# Patient Record
Sex: MALE | Race: WHITE | Employment: OTHER | URBAN - METROPOLITAN AREA
[De-identification: names, ages, dates, MRNs, and addresses within clinical notes are randomized per-mention and may not be internally consistent; named-entity substitution may affect disease eponyms.]

---

## 2017-04-07 ENCOUNTER — OFFICE VISIT (OUTPATIENT)
Dept: FAMILY MEDICINE CLINIC | Age: 67
End: 2017-04-07

## 2017-04-07 VITALS
SYSTOLIC BLOOD PRESSURE: 100 MMHG | WEIGHT: 172 LBS | BODY MASS INDEX: 24.68 KG/M2 | HEART RATE: 116 BPM | DIASTOLIC BLOOD PRESSURE: 68 MMHG

## 2017-04-07 DIAGNOSIS — I10 ESSENTIAL HYPERTENSION: Primary | ICD-10-CM

## 2017-04-07 PROCEDURE — G8420 CALC BMI NORM PARAMETERS: HCPCS | Performed by: FAMILY MEDICINE

## 2017-04-07 PROCEDURE — 1123F ACP DISCUSS/DSCN MKR DOCD: CPT | Performed by: FAMILY MEDICINE

## 2017-04-07 PROCEDURE — 3017F COLORECTAL CA SCREEN DOC REV: CPT | Performed by: FAMILY MEDICINE

## 2017-04-07 PROCEDURE — 4040F PNEUMOC VAC/ADMIN/RCVD: CPT | Performed by: FAMILY MEDICINE

## 2017-04-07 PROCEDURE — 1036F TOBACCO NON-USER: CPT | Performed by: FAMILY MEDICINE

## 2017-04-07 PROCEDURE — G8427 DOCREV CUR MEDS BY ELIG CLIN: HCPCS | Performed by: FAMILY MEDICINE

## 2017-04-07 PROCEDURE — 99213 OFFICE O/P EST LOW 20 MIN: CPT | Performed by: FAMILY MEDICINE

## 2017-04-07 RX ORDER — METOPROLOL SUCCINATE 50 MG/1
50 TABLET, EXTENDED RELEASE ORAL DAILY
Qty: 90 TABLET | Refills: 1 | Status: SHIPPED | OUTPATIENT
Start: 2017-04-07 | End: 2017-12-11 | Stop reason: SDUPTHER

## 2017-04-07 RX ORDER — ATORVASTATIN CALCIUM 10 MG/1
10 TABLET, FILM COATED ORAL DAILY
Qty: 90 TABLET | Refills: 1 | Status: SHIPPED | OUTPATIENT
Start: 2017-04-07 | End: 2017-12-26 | Stop reason: SDUPTHER

## 2017-04-07 ASSESSMENT — ENCOUNTER SYMPTOMS: RESPIRATORY NEGATIVE: 1

## 2017-08-21 RX ORDER — LOSARTAN POTASSIUM 100 MG/1
TABLET ORAL
Qty: 90 TABLET | Refills: 0 | Status: SHIPPED | OUTPATIENT
Start: 2017-08-21 | End: 2017-12-26 | Stop reason: SDUPTHER

## 2017-12-11 RX ORDER — METOPROLOL SUCCINATE 50 MG/1
50 TABLET, EXTENDED RELEASE ORAL DAILY
Qty: 90 TABLET | Refills: 0 | Status: SHIPPED | OUTPATIENT
Start: 2017-12-11 | End: 2017-12-26 | Stop reason: SDUPTHER

## 2017-12-26 ENCOUNTER — OFFICE VISIT (OUTPATIENT)
Dept: FAMILY MEDICINE CLINIC | Age: 67
End: 2017-12-26

## 2017-12-26 VITALS
WEIGHT: 168 LBS | HEART RATE: 68 BPM | BODY MASS INDEX: 24.11 KG/M2 | DIASTOLIC BLOOD PRESSURE: 76 MMHG | SYSTOLIC BLOOD PRESSURE: 106 MMHG

## 2017-12-26 DIAGNOSIS — I10 ESSENTIAL HYPERTENSION: Primary | ICD-10-CM

## 2017-12-26 PROCEDURE — G8484 FLU IMMUNIZE NO ADMIN: HCPCS | Performed by: FAMILY MEDICINE

## 2017-12-26 PROCEDURE — 4040F PNEUMOC VAC/ADMIN/RCVD: CPT | Performed by: FAMILY MEDICINE

## 2017-12-26 PROCEDURE — 3017F COLORECTAL CA SCREEN DOC REV: CPT | Performed by: FAMILY MEDICINE

## 2017-12-26 PROCEDURE — 1123F ACP DISCUSS/DSCN MKR DOCD: CPT | Performed by: FAMILY MEDICINE

## 2017-12-26 PROCEDURE — G8427 DOCREV CUR MEDS BY ELIG CLIN: HCPCS | Performed by: FAMILY MEDICINE

## 2017-12-26 PROCEDURE — 99214 OFFICE O/P EST MOD 30 MIN: CPT | Performed by: FAMILY MEDICINE

## 2017-12-26 PROCEDURE — G8420 CALC BMI NORM PARAMETERS: HCPCS | Performed by: FAMILY MEDICINE

## 2017-12-26 PROCEDURE — 1036F TOBACCO NON-USER: CPT | Performed by: FAMILY MEDICINE

## 2017-12-26 RX ORDER — METOPROLOL SUCCINATE 25 MG/1
25 TABLET, EXTENDED RELEASE ORAL DAILY
Qty: 90 TABLET | Refills: 1 | Status: SHIPPED | OUTPATIENT
Start: 2017-12-26 | End: 2019-01-14 | Stop reason: SDUPTHER

## 2017-12-26 RX ORDER — TADALAFIL 20 MG/1
20 TABLET ORAL PRN
Qty: 18 TABLET | Refills: 3 | Status: SHIPPED | OUTPATIENT
Start: 2017-12-26 | End: 2019-01-14

## 2017-12-26 RX ORDER — ATORVASTATIN CALCIUM 10 MG/1
10 TABLET, FILM COATED ORAL DAILY
Qty: 90 TABLET | Refills: 1 | Status: SHIPPED | OUTPATIENT
Start: 2017-12-26 | End: 2018-11-03 | Stop reason: SDUPTHER

## 2017-12-26 RX ORDER — LOSARTAN POTASSIUM 50 MG/1
TABLET ORAL
Qty: 90 TABLET | Refills: 1 | Status: SHIPPED | OUTPATIENT
Start: 2017-12-26 | End: 2018-11-03 | Stop reason: SDUPTHER

## 2017-12-26 ASSESSMENT — PATIENT HEALTH QUESTIONNAIRE - PHQ9
SUM OF ALL RESPONSES TO PHQ QUESTIONS 1-9: 0
1. LITTLE INTEREST OR PLEASURE IN DOING THINGS: 0
SUM OF ALL RESPONSES TO PHQ9 QUESTIONS 1 & 2: 0
2. FEELING DOWN, DEPRESSED OR HOPELESS: 0

## 2017-12-26 ASSESSMENT — ENCOUNTER SYMPTOMS
SHORTNESS OF BREATH: 0
WHEEZING: 0

## 2017-12-26 NOTE — PROGRESS NOTES
Subjective:      Patient ID: Nayely San is a 75 y.o. male. HPI  No SE from his meds, and feels fine. No LHed ness despite the low BP  Review of Systems   Respiratory: Negative for shortness of breath and wheezing. Allergic/Immunologic: Negative for environmental allergies. Objective:   Physical Exam   Constitutional: He appears well-developed and well-nourished. Neck: Normal range of motion. Neck supple. Carotid bruit is not present. No thyromegaly present. Cardiovascular: Normal rate, regular rhythm and normal heart sounds. No murmur heard. Pulmonary/Chest: Effort normal and breath sounds normal.   Abdominal: Soft. Bowel sounds are normal. He exhibits no abdominal bruit. There is no hepatosplenomegaly. There is no tenderness. Genitourinary: Rectum normal and prostate normal.   Genitourinary Comments: Prostate flat   Musculoskeletal: He exhibits no edema. Lymphadenopathy:     He has no cervical adenopathy. Assessment:    BP lower than necessary        Plan:      Decrease the metoprolol to 25 mg daily  Check BP in a week.   6 mos

## 2018-07-13 ENCOUNTER — OFFICE VISIT (OUTPATIENT)
Dept: FAMILY MEDICINE CLINIC | Age: 68
End: 2018-07-13

## 2018-07-13 VITALS
SYSTOLIC BLOOD PRESSURE: 130 MMHG | WEIGHT: 164 LBS | HEART RATE: 112 BPM | BODY MASS INDEX: 23.48 KG/M2 | HEIGHT: 70 IN | DIASTOLIC BLOOD PRESSURE: 84 MMHG

## 2018-07-13 DIAGNOSIS — Z79.899 ON STATIN THERAPY: ICD-10-CM

## 2018-07-13 DIAGNOSIS — Z00.00 MEDICARE ANNUAL WELLNESS VISIT, SUBSEQUENT: Primary | ICD-10-CM

## 2018-07-13 DIAGNOSIS — Z00.00 ROUTINE GENERAL MEDICAL EXAMINATION AT A HEALTH CARE FACILITY: ICD-10-CM

## 2018-07-13 DIAGNOSIS — I10 ESSENTIAL HYPERTENSION: ICD-10-CM

## 2018-07-13 DIAGNOSIS — E78.00 PURE HYPERCHOLESTEROLEMIA: ICD-10-CM

## 2018-07-13 DIAGNOSIS — R06.09 DOE (DYSPNEA ON EXERTION): ICD-10-CM

## 2018-07-13 LAB
A/G RATIO: 1.2 (ref 1.1–2.2)
ALBUMIN SERPL-MCNC: 4.7 G/DL (ref 3.4–5)
ALP BLD-CCNC: 60 U/L (ref 40–129)
ALT SERPL-CCNC: 38 U/L (ref 10–40)
ANION GAP SERPL CALCULATED.3IONS-SCNC: 23 MMOL/L (ref 3–16)
AST SERPL-CCNC: 73 U/L (ref 15–37)
BILIRUB SERPL-MCNC: 0.5 MG/DL (ref 0–1)
BUN BLDV-MCNC: 23 MG/DL (ref 7–20)
CALCIUM SERPL-MCNC: 9.7 MG/DL (ref 8.3–10.6)
CHLORIDE BLD-SCNC: 99 MMOL/L (ref 99–110)
CHOLESTEROL, TOTAL: 169 MG/DL (ref 0–199)
CO2: 18 MMOL/L (ref 21–32)
CREAT SERPL-MCNC: 2 MG/DL (ref 0.8–1.3)
GFR AFRICAN AMERICAN: 40
GFR NON-AFRICAN AMERICAN: 33
GLOBULIN: 3.8 G/DL
GLUCOSE BLD-MCNC: 79 MG/DL (ref 70–99)
HDLC SERPL-MCNC: 67 MG/DL (ref 40–60)
LDL CHOLESTEROL CALCULATED: 58 MG/DL
POTASSIUM SERPL-SCNC: 4.2 MMOL/L (ref 3.5–5.1)
SODIUM BLD-SCNC: 140 MMOL/L (ref 136–145)
TOTAL CK: 111 U/L (ref 39–308)
TOTAL PROTEIN: 8.5 G/DL (ref 6.4–8.2)
TRIGL SERPL-MCNC: 220 MG/DL (ref 0–150)
VLDLC SERPL CALC-MCNC: 44 MG/DL

## 2018-07-13 PROCEDURE — 36415 COLL VENOUS BLD VENIPUNCTURE: CPT | Performed by: FAMILY MEDICINE

## 2018-07-13 PROCEDURE — G0439 PPPS, SUBSEQ VISIT: HCPCS | Performed by: FAMILY MEDICINE

## 2018-07-13 PROCEDURE — 4040F PNEUMOC VAC/ADMIN/RCVD: CPT | Performed by: FAMILY MEDICINE

## 2018-07-13 RX ORDER — TADALAFIL 20 MG/1
20 TABLET ORAL PRN
Qty: 20 TABLET | Refills: 3 | Status: SHIPPED | OUTPATIENT
Start: 2018-07-13 | End: 2019-01-14 | Stop reason: SDUPTHER

## 2018-07-13 ASSESSMENT — LIFESTYLE VARIABLES
HOW MANY STANDARD DRINKS CONTAINING ALCOHOL DO YOU HAVE ON A TYPICAL DAY: 0
HOW OFTEN DO YOU HAVE SIX OR MORE DRINKS ON ONE OCCASION: 1
HOW OFTEN DURING THE LAST YEAR HAVE YOU HAD A FEELING OF GUILT OR REMORSE AFTER DRINKING: 0
HOW OFTEN DURING THE LAST YEAR HAVE YOU FAILED TO DO WHAT WAS NORMALLY EXPECTED FROM YOU BECAUSE OF DRINKING: 0
HOW OFTEN DURING THE LAST YEAR HAVE YOU FOUND THAT YOU WERE NOT ABLE TO STOP DRINKING ONCE YOU HAD STARTED: 0
HOW OFTEN DURING THE LAST YEAR HAVE YOU NEEDED AN ALCOHOLIC DRINK FIRST THING IN THE MORNING TO GET YOURSELF GOING AFTER A NIGHT OF HEAVY DRINKING: 0
AUDIT-C TOTAL SCORE: 3
HOW OFTEN DO YOU HAVE A DRINK CONTAINING ALCOHOL: 2
HOW OFTEN DURING THE LAST YEAR HAVE YOU BEEN UNABLE TO REMEMBER WHAT HAPPENED THE NIGHT BEFORE BECAUSE YOU HAD BEEN DRINKING: 0

## 2018-07-13 ASSESSMENT — PATIENT HEALTH QUESTIONNAIRE - PHQ9: SUM OF ALL RESPONSES TO PHQ QUESTIONS 1-9: 0

## 2018-07-13 ASSESSMENT — ANXIETY QUESTIONNAIRES: GAD7 TOTAL SCORE: 0

## 2018-07-13 NOTE — PROGRESS NOTES
Relation Age of Onset    Cancer Brother        CareTeam (Including outside providers/suppliers regularly involved in providing care):   Patient Care Team:  Teri Hoffman MD as PCP - General  Teri Hoffman MD as PCP - S Attributed Provider    Wt Readings from Last 3 Encounters:   07/13/18 164 lb (74.4 kg)   12/26/17 168 lb (76.2 kg)   04/07/17 172 lb (78 kg)     Vitals:    07/13/18 0830   BP: 130/84   Pulse: 112   Weight: 164 lb (74.4 kg)   Height: 5' 10\" (1.778 m)       Patient's complete Health Risk Assessment and screening values have been reviewed and are found in Flowsheets. The following problems were reviewed today and where indicated follow up appointments were made and/or referrals ordered. Positive Risk Factor Screenings with Interventions:     Health Habits/Nutrition:  Health Habits/Nutrition  Do you exercise for at least 20 minutes 2-3 times per week?: (!) No  Have you lost any weight without trying in the past 3 months?: No  Do you eat fewer than 2 meals per day?: No  Have you seen a dentist within the past year?: Yes  Body mass index is 23.53 kg/m².   Health Habits/Nutrition Interventions:  · Little interest in exercise    Safety:  Safety  Do you have working smoke detectors?: Yes  Have all throw rugs been removed or fastened?: Yes  Do you have non-slip mats in all bathtubs?: (!) No  Do all of your stairways have a railing or banister?: Yes  Are your doorways, halls and stairs free of clutter?: Yes  Do you always fasten your seatbelt when you are in a car?: Yes  Safety Interventions:  · Textured floor    Personalized Preventive Plan   Current Health Maintenance Status  Immunization History   Administered Date(s) Administered    Influenza, High Dose 10/07/2016    Pneumococcal 13-valent Conjugate ([id number]) 09/01/2015    Pneumococcal Polysaccharide ([id number]) 02/10/2014    Td 05/28/2013        Health Maintenance   Topic Date Due    Potassium monitoring  1950    Creatinine

## 2018-07-17 ENCOUNTER — TELEPHONE (OUTPATIENT)
Dept: FAMILY MEDICINE CLINIC | Age: 68
End: 2018-07-17

## 2018-07-17 DIAGNOSIS — N18.30 CHRONIC KIDNEY DISEASE, STAGE III (MODERATE) (HCC): Primary | ICD-10-CM

## 2018-07-23 RX ORDER — METOPROLOL SUCCINATE 50 MG/1
50 TABLET, EXTENDED RELEASE ORAL DAILY
Qty: 90 TABLET | Refills: 0 | Status: SHIPPED | OUTPATIENT
Start: 2018-07-23 | End: 2018-11-03 | Stop reason: SDUPTHER

## 2018-07-23 NOTE — TELEPHONE ENCOUNTER
Medication:   Requested Prescriptions     Pending Prescriptions Disp Refills    metoprolol succinate (TOPROL XL) 50 MG extended release tablet [Pharmacy Med Name: METOPROLOL ER SUCCINATE 50MG TABS] 90 tablet 0     Sig: TAKE 1 TABLET BY MOUTH DAILY         Last appt: 7/13/2018   Next appt: 1/14/2019

## 2018-07-31 RX ORDER — FLUTICASONE FUROATE AND VILANTEROL 100; 25 UG/1; UG/1
1 POWDER RESPIRATORY (INHALATION) DAILY
Qty: 3 EACH | Refills: 3 | Status: SHIPPED | OUTPATIENT
Start: 2018-07-31 | End: 2019-01-14

## 2018-07-31 NOTE — TELEPHONE ENCOUNTER
Patient requesting a medication refill. Medication Breo  Dosage 100-25 mcg  Frequencyone puff daily  Last filled on Sample given on 7/13/18  [facility]  Next office visit1/14/19     This will take 10 days to process. He is asking for another sample if possible.     LOV 7/13/18

## 2018-08-09 ENCOUNTER — TELEPHONE (OUTPATIENT)
Dept: FAMILY MEDICINE CLINIC | Age: 68
End: 2018-08-09

## 2018-11-05 RX ORDER — ATORVASTATIN CALCIUM 10 MG/1
10 TABLET, FILM COATED ORAL DAILY
Qty: 90 TABLET | Refills: 0 | Status: SHIPPED | OUTPATIENT
Start: 2018-11-05 | End: 2019-01-14 | Stop reason: SDUPTHER

## 2018-11-05 RX ORDER — LOSARTAN POTASSIUM 50 MG/1
TABLET ORAL
Qty: 90 TABLET | Refills: 0 | Status: SHIPPED | OUTPATIENT
Start: 2018-11-05 | End: 2019-01-14 | Stop reason: SDUPTHER

## 2018-11-05 RX ORDER — METOPROLOL SUCCINATE 50 MG/1
50 TABLET, EXTENDED RELEASE ORAL DAILY
Qty: 90 TABLET | Refills: 0 | Status: SHIPPED | OUTPATIENT
Start: 2018-11-05 | End: 2019-01-14 | Stop reason: DRUGHIGH

## 2019-01-14 ENCOUNTER — OFFICE VISIT (OUTPATIENT)
Dept: FAMILY MEDICINE CLINIC | Age: 69
End: 2019-01-14
Payer: MEDICARE

## 2019-01-14 VITALS
WEIGHT: 167 LBS | BODY MASS INDEX: 23.96 KG/M2 | HEART RATE: 89 BPM | SYSTOLIC BLOOD PRESSURE: 110 MMHG | DIASTOLIC BLOOD PRESSURE: 76 MMHG

## 2019-01-14 DIAGNOSIS — I10 ESSENTIAL HYPERTENSION: Primary | ICD-10-CM

## 2019-01-14 DIAGNOSIS — N52.9 VASCULOGENIC ERECTILE DYSFUNCTION, UNSPECIFIED VASCULOGENIC ERECTILE DYSFUNCTION TYPE: ICD-10-CM

## 2019-01-14 DIAGNOSIS — Z23 NEEDS FLU SHOT: ICD-10-CM

## 2019-01-14 DIAGNOSIS — E78.00 HYPERCHOLESTEREMIA: ICD-10-CM

## 2019-01-14 PROCEDURE — 1101F PT FALLS ASSESS-DOCD LE1/YR: CPT | Performed by: FAMILY MEDICINE

## 2019-01-14 PROCEDURE — G0008 ADMIN INFLUENZA VIRUS VAC: HCPCS | Performed by: FAMILY MEDICINE

## 2019-01-14 PROCEDURE — 1036F TOBACCO NON-USER: CPT | Performed by: FAMILY MEDICINE

## 2019-01-14 PROCEDURE — G8482 FLU IMMUNIZE ORDER/ADMIN: HCPCS | Performed by: FAMILY MEDICINE

## 2019-01-14 PROCEDURE — G8427 DOCREV CUR MEDS BY ELIG CLIN: HCPCS | Performed by: FAMILY MEDICINE

## 2019-01-14 PROCEDURE — 3017F COLORECTAL CA SCREEN DOC REV: CPT | Performed by: FAMILY MEDICINE

## 2019-01-14 PROCEDURE — 4040F PNEUMOC VAC/ADMIN/RCVD: CPT | Performed by: FAMILY MEDICINE

## 2019-01-14 PROCEDURE — 90662 IIV NO PRSV INCREASED AG IM: CPT | Performed by: FAMILY MEDICINE

## 2019-01-14 PROCEDURE — 99214 OFFICE O/P EST MOD 30 MIN: CPT | Performed by: FAMILY MEDICINE

## 2019-01-14 PROCEDURE — 1123F ACP DISCUSS/DSCN MKR DOCD: CPT | Performed by: FAMILY MEDICINE

## 2019-01-14 PROCEDURE — G8420 CALC BMI NORM PARAMETERS: HCPCS | Performed by: FAMILY MEDICINE

## 2019-01-14 RX ORDER — LOSARTAN POTASSIUM 50 MG/1
50 TABLET ORAL DAILY
Qty: 90 TABLET | Refills: 1 | Status: SHIPPED | OUTPATIENT
Start: 2019-01-14 | End: 2019-05-31

## 2019-01-14 RX ORDER — LOSARTAN POTASSIUM 50 MG/1
50 TABLET ORAL DAILY
Qty: 90 TABLET | Refills: 1 | Status: SHIPPED | OUTPATIENT
Start: 2019-01-14 | End: 2019-01-14 | Stop reason: SDUPTHER

## 2019-01-14 RX ORDER — SILDENAFIL 100 MG/1
100 TABLET, FILM COATED ORAL DAILY PRN
Qty: 20 TABLET | Refills: 3 | Status: SHIPPED | OUTPATIENT
Start: 2019-01-14 | End: 2019-01-14 | Stop reason: SDUPTHER

## 2019-01-14 RX ORDER — ATORVASTATIN CALCIUM 10 MG/1
10 TABLET, FILM COATED ORAL DAILY
Qty: 90 TABLET | Refills: 3 | Status: SHIPPED | OUTPATIENT
Start: 2019-01-14 | End: 2019-01-14 | Stop reason: SDUPTHER

## 2019-01-14 RX ORDER — ATORVASTATIN CALCIUM 10 MG/1
10 TABLET, FILM COATED ORAL DAILY
Qty: 90 TABLET | Refills: 3 | Status: SHIPPED | OUTPATIENT
Start: 2019-01-14 | End: 2020-01-15 | Stop reason: SDUPTHER

## 2019-01-14 RX ORDER — METOPROLOL SUCCINATE 25 MG/1
25 TABLET, EXTENDED RELEASE ORAL DAILY
Qty: 90 TABLET | Refills: 1 | Status: SHIPPED | OUTPATIENT
Start: 2019-01-14 | End: 2020-01-15 | Stop reason: SDUPTHER

## 2019-01-14 RX ORDER — METOPROLOL SUCCINATE 25 MG/1
25 TABLET, EXTENDED RELEASE ORAL DAILY
Qty: 90 TABLET | Refills: 1 | Status: SHIPPED | OUTPATIENT
Start: 2019-01-14 | End: 2019-01-14 | Stop reason: SDUPTHER

## 2019-01-14 RX ORDER — SILDENAFIL 100 MG/1
100 TABLET, FILM COATED ORAL DAILY PRN
Qty: 20 TABLET | Refills: 3 | Status: SHIPPED | OUTPATIENT
Start: 2019-01-14 | End: 2020-01-15 | Stop reason: SDUPTHER

## 2019-01-14 ASSESSMENT — ENCOUNTER SYMPTOMS
SHORTNESS OF BREATH: 1
GASTROINTESTINAL NEGATIVE: 1

## 2019-03-04 ENCOUNTER — TELEPHONE (OUTPATIENT)
Dept: FAMILY MEDICINE CLINIC | Age: 69
End: 2019-03-04

## 2019-05-31 ENCOUNTER — TELEPHONE (OUTPATIENT)
Dept: FAMILY MEDICINE CLINIC | Age: 69
End: 2019-05-31

## 2019-05-31 RX ORDER — CANDESARTAN 8 MG/1
8 TABLET ORAL DAILY
Qty: 30 TABLET | Refills: 5 | Status: CANCELLED | OUTPATIENT
Start: 2019-05-31

## 2019-05-31 NOTE — TELEPHONE ENCOUNTER
Pt got a recall notice from [facility] for Losartan. Please call in a different medication.   Please send it to   HEART [facility] [address], [address]    LOV 1/14/19  FUTURE VISIT 7/15/19

## 2019-07-15 ENCOUNTER — OFFICE VISIT (OUTPATIENT)
Dept: FAMILY MEDICINE CLINIC | Age: 69
End: 2019-07-15
Payer: MEDICARE

## 2019-07-15 VITALS
BODY MASS INDEX: 23.77 KG/M2 | HEIGHT: 70 IN | SYSTOLIC BLOOD PRESSURE: 134 MMHG | DIASTOLIC BLOOD PRESSURE: 88 MMHG | WEIGHT: 166 LBS | HEART RATE: 80 BPM

## 2019-07-15 DIAGNOSIS — Z12.2 ENCOUNTER FOR SCREENING FOR CANCER OF RESPIRATORY ORGANS: ICD-10-CM

## 2019-07-15 DIAGNOSIS — E78.00 HYPERCHOLESTEREMIA: ICD-10-CM

## 2019-07-15 DIAGNOSIS — Z00.00 ROUTINE GENERAL MEDICAL EXAMINATION AT A HEALTH CARE FACILITY: ICD-10-CM

## 2019-07-15 DIAGNOSIS — Z12.5 SCREENING FOR PROSTATE CANCER: Primary | ICD-10-CM

## 2019-07-15 DIAGNOSIS — Z87.891 PERSONAL HISTORY OF TOBACCO USE: ICD-10-CM

## 2019-07-15 LAB
CHOLESTEROL, FASTING: 149 MG/DL (ref 0–199)
HDLC SERPL-MCNC: 60 MG/DL (ref 40–60)
LDL CHOLESTEROL CALCULATED: 52 MG/DL
PROSTATE SPECIFIC ANTIGEN: 0.7 NG/ML (ref 0–4)
TRIGLYCERIDE, FASTING: 186 MG/DL (ref 0–150)
VLDLC SERPL CALC-MCNC: 37 MG/DL

## 2019-07-15 PROCEDURE — 4040F PNEUMOC VAC/ADMIN/RCVD: CPT | Performed by: FAMILY MEDICINE

## 2019-07-15 PROCEDURE — 36415 COLL VENOUS BLD VENIPUNCTURE: CPT | Performed by: FAMILY MEDICINE

## 2019-07-15 PROCEDURE — 3017F COLORECTAL CA SCREEN DOC REV: CPT | Performed by: FAMILY MEDICINE

## 2019-07-15 PROCEDURE — G0296 VISIT TO DETERM LDCT ELIG: HCPCS | Performed by: FAMILY MEDICINE

## 2019-07-15 PROCEDURE — G0439 PPPS, SUBSEQ VISIT: HCPCS | Performed by: FAMILY MEDICINE

## 2019-07-15 PROCEDURE — 1123F ACP DISCUSS/DSCN MKR DOCD: CPT | Performed by: FAMILY MEDICINE

## 2019-07-15 RX ORDER — OLMESARTAN MEDOXOMIL 20 MG/1
20 TABLET ORAL DAILY
Qty: 90 TABLET | Refills: 1 | Status: SHIPPED | OUTPATIENT
Start: 2019-07-15 | End: 2020-01-15 | Stop reason: SDUPTHER

## 2019-07-15 ASSESSMENT — LIFESTYLE VARIABLES
HOW OFTEN DURING THE LAST YEAR HAVE YOU HAD A FEELING OF GUILT OR REMORSE AFTER DRINKING: 0
HAS A RELATIVE, FRIEND, DOCTOR, OR ANOTHER HEALTH PROFESSIONAL EXPRESSED CONCERN ABOUT YOUR DRINKING OR SUGGESTED YOU CUT DOWN: 0
AUDIT TOTAL SCORE: 2
HOW OFTEN DO YOU HAVE A DRINK CONTAINING ALCOHOL: 1
HOW OFTEN DURING THE LAST YEAR HAVE YOU FAILED TO DO WHAT WAS NORMALLY EXPECTED FROM YOU BECAUSE OF DRINKING: 0
HOW MANY STANDARD DRINKS CONTAINING ALCOHOL DO YOU HAVE ON A TYPICAL DAY: 0
HOW OFTEN DURING THE LAST YEAR HAVE YOU BEEN UNABLE TO REMEMBER WHAT HAPPENED THE NIGHT BEFORE BECAUSE YOU HAD BEEN DRINKING: 0
HOW OFTEN DURING THE LAST YEAR HAVE YOU FOUND THAT YOU WERE NOT ABLE TO STOP DRINKING ONCE YOU HAD STARTED: 0
HOW OFTEN DURING THE LAST YEAR HAVE YOU NEEDED AN ALCOHOLIC DRINK FIRST THING IN THE MORNING TO GET YOURSELF GOING AFTER A NIGHT OF HEAVY DRINKING: 0
AUDIT-C TOTAL SCORE: 2
HOW OFTEN DO YOU HAVE SIX OR MORE DRINKS ON ONE OCCASION: 1
HAVE YOU OR SOMEONE ELSE BEEN INJURED AS A RESULT OF YOUR DRINKING: 0

## 2019-07-15 ASSESSMENT — PATIENT HEALTH QUESTIONNAIRE - PHQ9
SUM OF ALL RESPONSES TO PHQ QUESTIONS 1-9: 0
SUM OF ALL RESPONSES TO PHQ QUESTIONS 1-9: 0

## 2019-07-15 NOTE — PROGRESS NOTES
Relation Age of Onset    Coronary Art Dis Brother        CareTeam (Including outside providers/suppliers regularly involved in providing care):   Patient Care Team:  Oriana Puente MD as PCP - General  Oriana Puente MD as PCP - [facility] Provider    Wt Readings from Last 3 Encounters:   07/15/19 166 lb (75.3 kg)   01/14/19 167 lb (75.8 kg)   07/13/18 164 lb (74.4 kg)     Vitals:    07/15/19 0830   BP: 134/88   Pulse: 80   Weight: 166 lb (75.3 kg)   Height: 5' 10\" (1.778 m)     Body mass index is 23.82 kg/m². Based upon direct observation of the patient, evaluation of cognition reveals normal memory  Subjective:      Patient ID: Hugo Herring is a 78 y.o. male. HPI  Losartan was recalled for the third time    Review of Systems    Objective:   Physical Exam    Assessment:    HTN not controlled  Must change BP med      Plan:      Stop losartan, start valsartan        Oriana Puente MD      Patient's complete Health Risk Assessment and screening values have been reviewed and are found in Flowsheets. The following problems were reviewed today and where indicated follow up appointments were made and/or referrals ordered. Positive Risk Factor Screenings with Interventions:     General Health:  General  In general, how would you say your health is?: Very Good  In the past 7 days, have you experienced any of the following? New or Increased Pain, New or Increased Fatigue, Loneliness, Social Isolation, Stress or Anger?: (!) Stress  Do you get the social and emotional support that you need?: Yes  Do you have a Living Will?: Yes  General Health Risk Interventions:  · Homeowner woes!     Safety:  Safety  Do you have working smoke detectors?: Yes  Have all throw rugs been removed or fastened?: Yes  Do you have non-slip mats or surfaces in all bathtubs/showers?: (!) No  Do all of your stairways have a railing or banister?: Yes  Are your doorways, halls and stairs free of clutter?: Yes  Do you always fasten make health care decisions for the patient if incompetent. Patient was asked to complete the declaration forms, either acknowledge the forms by a public notary or an eligible witness and provide a signed copy to the practice office.

## 2019-07-26 ENCOUNTER — HOSPITAL ENCOUNTER (OUTPATIENT)
Dept: CT IMAGING | Age: 69
Discharge: HOME | End: 2019-07-26
Payer: MEDICARE

## 2019-07-26 DIAGNOSIS — Z87.891 PERSONAL HISTORY OF TOBACCO USE: ICD-10-CM

## 2019-07-26 PROCEDURE — G0297 LDCT FOR LUNG CA SCREEN: HCPCS

## 2019-07-29 ENCOUNTER — TELEPHONE (OUTPATIENT)
Dept: PRIMARY CARE CLINIC | Age: 69
End: 2019-07-29

## 2019-07-29 ENCOUNTER — NURSE ONLY (OUTPATIENT)
Dept: PRIMARY CARE CLINIC | Age: 69
End: 2019-07-29

## 2019-07-29 ENCOUNTER — TELEPHONE (OUTPATIENT)
Dept: CASE MANAGEMENT | Age: 69
End: 2019-07-29

## 2019-07-29 VITALS — SYSTOLIC BLOOD PRESSURE: 128 MMHG | DIASTOLIC BLOOD PRESSURE: 75 MMHG

## 2019-07-29 DIAGNOSIS — N28.89 LEFT RENAL MASS: Primary | ICD-10-CM

## 2019-07-29 NOTE — TELEPHONE ENCOUNTER
Dr. Ramona White,    07/26/19 CT Lung screening completed:    Impression       1. Lung RADS category 2-benign appearance         Recommendation: Continued annual low-dose screening CT chest.       Lung RADS S-significant findings: Marked diffuse fatty infiltration of the liver. Marked coronary artery calcification which is a risk factor for acute coronary syndrome. Possible mass left kidney. CT abdomen without with contrast recommended.       Recommendation: CT abdomen without and with intravenous contrast with possible mass left kidney.      Thanks,  Saba MEDRANON, RN  Lung Nodule Navigator  [phone]

## 2019-07-29 NOTE — PROGRESS NOTES
Pt seen for BP check  128/75  olmesartan (BENICAR) 20 MG tablet started 7/15  PT is complaining of Dizziness

## 2019-07-29 NOTE — TELEPHONE ENCOUNTER
Pt seen for BP check  olmesartan (BENICAR) 20 MG tablet started 7/15  PT is complaining of Dizziness

## 2019-08-06 NOTE — TELEPHONE ENCOUNTER
This lesion was not seen on the ultrasound of the kidneys from August 2018, so yes you need the CT scan

## 2019-08-06 NOTE — TELEPHONE ENCOUNTER
Spoke to pt and relayed message. Pt states that he was informed that his results were normal. He states that he seen Dr. Mora Husbands a year ago, is this something new from a year ago?

## 2019-08-06 NOTE — TELEPHONE ENCOUNTER
CT of the lungs demonstrated possible mass of the left kidney.   For this reason we have to go ahead and get a CT of the abdomen and pelvis to get a better look

## 2019-08-07 ENCOUNTER — TELEPHONE (OUTPATIENT)
Dept: PRIMARY CARE CLINIC | Age: 69
End: 2019-08-07

## 2019-08-09 ENCOUNTER — TELEPHONE (OUTPATIENT)
Dept: PRIMARY CARE CLINIC | Age: 69
End: 2019-08-09

## 2019-08-09 DIAGNOSIS — N28.89 RENAL MASS OF UNKNOWN NATURE: Primary | ICD-10-CM

## 2019-08-21 ENCOUNTER — HOSPITAL ENCOUNTER (OUTPATIENT)
Dept: ULTRASOUND IMAGING | Age: 69
Discharge: HOME | End: 2019-08-21
Payer: MEDICARE

## 2019-08-21 DIAGNOSIS — N28.89 RENAL MASS OF UNKNOWN NATURE: ICD-10-CM

## 2019-08-21 PROCEDURE — 76770 US EXAM ABDO BACK WALL COMP: CPT

## 2019-09-27 RX ORDER — LOSARTAN POTASSIUM 50 MG/1
TABLET ORAL
Qty: 90 TABLET | Refills: 0 | Status: SHIPPED | OUTPATIENT
Start: 2019-09-27 | End: 2020-01-15 | Stop reason: SDUPTHER

## 2020-01-15 ENCOUNTER — OFFICE VISIT (OUTPATIENT)
Dept: PRIMARY CARE CLINIC | Age: 70
End: 2020-01-15
Payer: MEDICARE

## 2020-01-15 ENCOUNTER — TELEPHONE (OUTPATIENT)
Dept: PRIMARY CARE CLINIC | Age: 70
End: 2020-01-15

## 2020-01-15 VITALS
OXYGEN SATURATION: 97 % | TEMPERATURE: 97.6 F | BODY MASS INDEX: 24.11 KG/M2 | HEART RATE: 126 BPM | SYSTOLIC BLOOD PRESSURE: 110 MMHG | WEIGHT: 168 LBS | DIASTOLIC BLOOD PRESSURE: 70 MMHG

## 2020-01-15 PROCEDURE — 99214 OFFICE O/P EST MOD 30 MIN: CPT | Performed by: FAMILY MEDICINE

## 2020-01-15 PROCEDURE — 1036F TOBACCO NON-USER: CPT | Performed by: FAMILY MEDICINE

## 2020-01-15 PROCEDURE — G8484 FLU IMMUNIZE NO ADMIN: HCPCS | Performed by: FAMILY MEDICINE

## 2020-01-15 PROCEDURE — 1123F ACP DISCUSS/DSCN MKR DOCD: CPT | Performed by: FAMILY MEDICINE

## 2020-01-15 PROCEDURE — G8420 CALC BMI NORM PARAMETERS: HCPCS | Performed by: FAMILY MEDICINE

## 2020-01-15 PROCEDURE — G8427 DOCREV CUR MEDS BY ELIG CLIN: HCPCS | Performed by: FAMILY MEDICINE

## 2020-01-15 PROCEDURE — 3017F COLORECTAL CA SCREEN DOC REV: CPT | Performed by: FAMILY MEDICINE

## 2020-01-15 PROCEDURE — 4040F PNEUMOC VAC/ADMIN/RCVD: CPT | Performed by: FAMILY MEDICINE

## 2020-01-15 RX ORDER — ATORVASTATIN CALCIUM 10 MG/1
10 TABLET, FILM COATED ORAL DAILY
Qty: 90 TABLET | Refills: 2 | Status: SHIPPED | OUTPATIENT
Start: 2020-01-15

## 2020-01-15 RX ORDER — LOSARTAN POTASSIUM 50 MG/1
50 TABLET ORAL DAILY
Qty: 90 TABLET | Refills: 1 | Status: SHIPPED | OUTPATIENT
Start: 2020-01-15 | End: 2020-08-24

## 2020-01-15 RX ORDER — SILDENAFIL 100 MG/1
100 TABLET, FILM COATED ORAL DAILY PRN
Qty: 20 TABLET | Refills: 3 | Status: SHIPPED | OUTPATIENT
Start: 2020-01-15

## 2020-01-15 RX ORDER — METOPROLOL SUCCINATE 25 MG/1
25 TABLET, EXTENDED RELEASE ORAL DAILY
Qty: 90 TABLET | Refills: 1 | Status: SHIPPED | OUTPATIENT
Start: 2020-01-15 | End: 2020-08-24

## 2020-01-15 RX ORDER — OLMESARTAN MEDOXOMIL 20 MG/1
20 TABLET ORAL DAILY
Qty: 90 TABLET | Refills: 1 | Status: SHIPPED | OUTPATIENT
Start: 2020-01-15 | End: 2020-01-15

## 2020-01-15 ASSESSMENT — PATIENT HEALTH QUESTIONNAIRE - PHQ9
1. LITTLE INTEREST OR PLEASURE IN DOING THINGS: 0
SUM OF ALL RESPONSES TO PHQ QUESTIONS 1-9: 0
2. FEELING DOWN, DEPRESSED OR HOPELESS: 0
SUM OF ALL RESPONSES TO PHQ9 QUESTIONS 1 & 2: 0
SUM OF ALL RESPONSES TO PHQ QUESTIONS 1-9: 0

## 2020-04-21 ENCOUNTER — VIRTUAL VISIT (OUTPATIENT)
Dept: PRIMARY CARE CLINIC | Age: 70
End: 2020-04-21
Payer: MEDICARE

## 2020-04-21 ENCOUNTER — NURSE TRIAGE (OUTPATIENT)
Dept: OTHER | Facility: CLINIC | Age: 70
End: 2020-04-21

## 2020-04-21 PROCEDURE — G8420 CALC BMI NORM PARAMETERS: HCPCS | Performed by: FAMILY MEDICINE

## 2020-04-21 PROCEDURE — 1123F ACP DISCUSS/DSCN MKR DOCD: CPT | Performed by: FAMILY MEDICINE

## 2020-04-21 PROCEDURE — 4040F PNEUMOC VAC/ADMIN/RCVD: CPT | Performed by: FAMILY MEDICINE

## 2020-04-21 PROCEDURE — G8427 DOCREV CUR MEDS BY ELIG CLIN: HCPCS | Performed by: FAMILY MEDICINE

## 2020-04-21 PROCEDURE — 3017F COLORECTAL CA SCREEN DOC REV: CPT | Performed by: FAMILY MEDICINE

## 2020-04-21 PROCEDURE — 99214 OFFICE O/P EST MOD 30 MIN: CPT | Performed by: FAMILY MEDICINE

## 2020-04-21 PROCEDURE — 1036F TOBACCO NON-USER: CPT | Performed by: FAMILY MEDICINE

## 2020-04-21 RX ORDER — IPRATROPIUM BROMIDE AND ALBUTEROL SULFATE 2.5; .5 MG/3ML; MG/3ML
1 SOLUTION RESPIRATORY (INHALATION) EVERY 6 HOURS PRN
Qty: 120 VIAL | Refills: 1 | Status: SHIPPED | OUTPATIENT
Start: 2020-04-21

## 2020-04-21 SDOH — ECONOMIC STABILITY: FOOD INSECURITY: WITHIN THE PAST 12 MONTHS, YOU WORRIED THAT YOUR FOOD WOULD RUN OUT BEFORE YOU GOT MONEY TO BUY MORE.: NEVER TRUE

## 2020-04-21 SDOH — ECONOMIC STABILITY: TRANSPORTATION INSECURITY
IN THE PAST 12 MONTHS, HAS THE LACK OF TRANSPORTATION KEPT YOU FROM MEDICAL APPOINTMENTS OR FROM GETTING MEDICATIONS?: NO

## 2020-04-21 SDOH — ECONOMIC STABILITY: INCOME INSECURITY: HOW HARD IS IT FOR YOU TO PAY FOR THE VERY BASICS LIKE FOOD, HOUSING, MEDICAL CARE, AND HEATING?: NOT HARD AT ALL

## 2020-04-21 SDOH — ECONOMIC STABILITY: FOOD INSECURITY: WITHIN THE PAST 12 MONTHS, THE FOOD YOU BOUGHT JUST DIDN'T LAST AND YOU DIDN'T HAVE MONEY TO GET MORE.: NEVER TRUE

## 2020-04-21 SDOH — ECONOMIC STABILITY: TRANSPORTATION INSECURITY
IN THE PAST 12 MONTHS, HAS LACK OF TRANSPORTATION KEPT YOU FROM MEETINGS, WORK, OR FROM GETTING THINGS NEEDED FOR DAILY LIVING?: NO

## 2020-04-21 NOTE — PROGRESS NOTES
2020    TELEHEALTH EVALUATION -- Audio/Visual (During ULXBE-16 public health emergency)    HPI:    Rema Vega (:  1950) has requested an audio/video evaluation for the following concern(s):    Hypertension, progressive dyspnea on exertion, hyperlipidemia     I prescribed Breo inhaler for Mr. Karina Dye earlier he tried it but it did not help his lungs. For over a year he has had progressive dyspnea on exertion such that almost any movement will make him short of breath. CT of the lungs last year showed a possible mass in the kidney but subsequent evaluation failed to demonstrate it. There was calcification of the coronary arteries however he is cholesterol is low. As long as the cholesterol is low I do not see the need to reimage the coronaries. Review of Systems    Prior to Visit Medications    Medication Sig Taking? Authorizing Provider   losartan (COZAAR) 50 MG tablet Take 1 tablet by mouth daily Yes Beryl Feliciano MD   atorvastatin (LIPITOR) 10 MG tablet Take 1 tablet by mouth daily Yes Beryl Feliciano MD   metoprolol succinate (TOPROL XL) 25 MG extended release tablet Take 1 tablet by mouth daily Yes Beryl Feliciano MD   naproxen (NAPROSYN) 500 MG tablet Take 1 tablet by mouth 2 times daily (with meals) Yes Beryl Feliciano MD   aspirin (ASPIRIN CHILDRENS) 81 MG chewable tablet Take 1 tablet by mouth daily. Yes Beryl Feliciano MD   sildenafil (VIAGRA) 100 MG tablet Take 1 tablet by mouth daily as needed for Erectile Dysfunction  Patient not taking: Reported on 2020  Beryl Feliciano MD       Social History     Tobacco Use    Smoking status: Former Smoker     Packs/day: 1.50     Years: 43.00     Pack years: 64.50     Types: Cigarettes     Last attempt to quit: 2011     Years since quittin.8    Smokeless tobacco: Never Used   Substance Use Topics    Alcohol use: Yes     Comment: weekend    Drug use:  No            PHYSICAL EXAMINATION:  [ INSTRUCTIONS:  \"[x]\" Indicates a positive item  \"[]\" Indicates a negative item  -- DELETE ALL ITEMS NOT EXAMINED]  Vital Signs: (As obtained by patient/caregiver or practitioner observation)    Blood pressure-  Heart rate-    Respiratory rate-    Temperature-  Pulse oximetry-     Constitutional: [x] Appears well-developed and well-nourished [] No apparent distress      [] Abnormal-   Mental status  [x] Alert and awake  [x] Oriented to person/place/time []Able to follow commands      Eyes:  EOM    [x]  Normal  [] Abnormal-  Sclera  []  Normal  [] Abnormal -         Discharge []  None visible  [] Abnormal -    HENT:   [x] Normocephalic, atraumatic.   [] Abnormal   [] Mouth/Throat: Mucous membranes are moist.     External Ears [] Normal  [] Abnormal-     Neck: [x] No visualized mass     Pulmonary/Chest: [x] Respiratory effort normal.  [x] No visualized signs of difficulty breathing or respiratory distress        [] Abnormal-      Musculoskeletal:   [] Normal gait with no signs of ataxia         [x] Normal range of motion of neck        [] Abnormal-       Neurological:        [x] No Facial Asymmetry (Cranial nerve 7 motor function) (limited exam to video visit)          [] No gaze palsy        [] Abnormal-         Skin:        [x] No significant exanthematous lesions or discoloration noted on facial skin         [] Abnormal-            Psychiatric:       [x] Normal Affect [] No Hallucinations        [] Abnormal-     Other pertinent observable physical exam findings-     ASSESSMENT/PLAN:  Probable COPD    P:I will try to find an anticholinergic long-acting inhaler that is covered on his insurance-this will have to be a hand-held nebulizer and generic combination of ipratropium and albuterol  The patient will have to purchase the nebulizer and the tubing  PFTs    Spent 25+ min with the patient, > than 50% of the time with evaluation/counselling/coordination of care, much of the time spent trying to find a anticholinergic and adrenergic

## 2020-04-21 NOTE — TELEPHONE ENCOUNTER
Pt calling for a different inhaler as the Breo did not help. Pt was sent to nurse line for SOB. Pt has no other complaints and is in no distress. Will send for a telephone encounter for PCP to get in touch with .

## 2020-04-21 NOTE — TELEPHONE ENCOUNTER
Patient  is calling with questions regarding medication     Medication:  Waqar Inhaler  Question or Error: Inhaler is not helping. Would like to try a different inhaler.   Date of last visit 01/15/2020    Pharmacy confirmed:[facility] [address], [address]

## 2020-04-22 ENCOUNTER — TELEPHONE (OUTPATIENT)
Dept: PRIMARY CARE CLINIC | Age: 70
End: 2020-04-22

## 2020-04-24 ENCOUNTER — TELEPHONE (OUTPATIENT)
Dept: PRIMARY CARE CLINIC | Age: 70
End: 2020-04-24

## 2020-05-22 ENCOUNTER — PATIENT MESSAGE (OUTPATIENT)
Dept: PRIMARY CARE CLINIC | Age: 70
End: 2020-05-22

## 2020-06-01 ENCOUNTER — HOSPITAL ENCOUNTER (OUTPATIENT)
Dept: CT IMAGING | Age: 70
Discharge: HOME | End: 2020-06-01
Payer: MEDICARE

## 2020-06-01 PROCEDURE — 71250 CT THORAX DX C-: CPT

## 2020-06-04 ENCOUNTER — TELEPHONE (OUTPATIENT)
Dept: PULMONOLOGY | Age: 70
End: 2020-06-04

## 2020-06-05 ENCOUNTER — VIRTUAL VISIT (OUTPATIENT)
Dept: PULMONOLOGY | Age: 70
End: 2020-06-05
Payer: MEDICARE

## 2020-06-05 VITALS — BODY MASS INDEX: 23.38 KG/M2 | WEIGHT: 167 LBS | HEIGHT: 71 IN

## 2020-06-05 PROBLEM — J84.9 ILD (INTERSTITIAL LUNG DISEASE) (HCC): Status: ACTIVE | Noted: 2020-06-05

## 2020-06-05 PROCEDURE — 3017F COLORECTAL CA SCREEN DOC REV: CPT | Performed by: INTERNAL MEDICINE

## 2020-06-05 PROCEDURE — G8420 CALC BMI NORM PARAMETERS: HCPCS | Performed by: INTERNAL MEDICINE

## 2020-06-05 PROCEDURE — 1123F ACP DISCUSS/DSCN MKR DOCD: CPT | Performed by: INTERNAL MEDICINE

## 2020-06-05 PROCEDURE — 99205 OFFICE O/P NEW HI 60 MIN: CPT | Performed by: INTERNAL MEDICINE

## 2020-06-05 PROCEDURE — G8427 DOCREV CUR MEDS BY ELIG CLIN: HCPCS | Performed by: INTERNAL MEDICINE

## 2020-06-05 PROCEDURE — 1036F TOBACCO NON-USER: CPT | Performed by: INTERNAL MEDICINE

## 2020-06-05 PROCEDURE — 4040F PNEUMOC VAC/ADMIN/RCVD: CPT | Performed by: INTERNAL MEDICINE

## 2020-06-05 ASSESSMENT — ENCOUNTER SYMPTOMS
VOMITING: 0
EYE ITCHING: 0
EYE REDNESS: 0
NAUSEA: 0
EYE PAIN: 0
SHORTNESS OF BREATH: 0
CHEST TIGHTNESS: 0
COUGH: 0
BACK PAIN: 0
WHEEZING: 0
ABDOMINAL PAIN: 0

## 2020-06-05 NOTE — PROGRESS NOTES
Pack years: 64.50    Types: Cigarettes    Last attempt to quit: 2011    Years since quittin.9   Smokeless Tobacco Never Used       Family History:  Family History   Problem Relation Age of Onset    Coronary Art Dis Brother        Current Medications:  Current Outpatient Medications on File Prior to Visit   Medication Sig Dispense Refill    ipratropium-albuterol (DUONEB) 0.5-2.5 (3) MG/3ML SOLN nebulizer solution Inhale 3 mLs into the lungs every 6 hours as needed for Shortness of Breath 120 vial 1    losartan (COZAAR) 50 MG tablet Take 1 tablet by mouth daily 90 tablet 1    atorvastatin (LIPITOR) 10 MG tablet Take 1 tablet by mouth daily 90 tablet 2    metoprolol succinate (TOPROL XL) 25 MG extended release tablet Take 1 tablet by mouth daily 90 tablet 1    sildenafil (VIAGRA) 100 MG tablet Take 1 tablet by mouth daily as needed for Erectile Dysfunction 20 tablet 3    naproxen (NAPROSYN) 500 MG tablet Take 1 tablet by mouth 2 times daily (with meals) 60 tablet 3    aspirin (ASPIRIN CHILDRENS) 81 MG chewable tablet Take 1 tablet by mouth daily. 30 tablet 11     No current facility-administered medications on file prior to visit. REVIEW OF SYSTEMS:    Review of Systems   Constitutional: Positive for unexpected weight change (lost 5 lbs over years). Negative for chills, fatigue and fever. HENT: Negative for congestion and postnasal drip. Eyes: Negative for pain, redness and itching. Respiratory: Negative for cough, chest tightness, shortness of breath and wheezing. Cardiovascular: Negative for chest pain, palpitations and leg swelling. Gastrointestinal: Negative for abdominal pain, nausea and vomiting. Musculoskeletal: Negative for arthralgias, back pain and myalgias. Skin: Negative for rash. Neurological: Negative for weakness. Psychiatric/Behavioral: The patient is not nervous/anxious. All other systems reviewed and are negative.     Objective:   PHYSICAL EXAM: VITALS:  Ht 5' 10.5\" (1.791 m)   Wt 167 lb (75.8 kg)   BMI 23.62 kg/m²     Physical Exam    DATA:    PFT on 5/18/20  FEV1/FVC is 71 with predicted ratio of 73. Post bronchodilator FEV1/FVC is 77  FEV1 is 1.87L which is 58% predicted - improved to 2. 02L with 8% reversibility  FVC is 2.62 which is 60% predicted   TLC is 3.96 which is 56% predicted   RV is 1.31 which is 49% predicted  DLCO is 11.42 which is 39% predicted     CT chest on 6/1/20  The predominant pattern is irregular reticulation type subpleural fibrosis which is more prominent in the upper lobes but also involves the lower lobes. There is early honeycombing in the right upper lobe. Primarily due to the distribution, CT findings    are not diagnostic for a UIP pattern. Differential considerations include hypersensitivity pneumonitis, sarcoidosis, nonspecific interstitial pneumonitis, and less likely pneumoconiosis but idiopathic pulmonary fibrosis remains a primary consideration    particularly given the rapid progression since the prior study. LDCT on 7/26/19  FINDINGS:       LUNGS AND AIRWAYS: Airways are patent.  No lobar consolidation. Mild reticulation in the subpleural regions most pronounced about the upper lobes may relate to pneumonitis. Pulmonary nodule right apex axial image 37 oval solid measures 6 x 3 mm. Subpleural pulmonary nodules at the apex measuring less than 5 mm axial image 47.       PLEURA: Mild biapical pleural thickening likely relates to fibrosis. No pleural fluid.       HEART AND GREAT VESSELS: Heart size is normal.  Calcification of the thoracic aorta. No aneurysm. Marked coronary artery calcification. No pericardial effusion.       ADENOPATHY/MEDIASTINUM: No adenopathy.       CHEST WALL / LOWER NECK: No significant abnormality. Assessment:      Diagnosis Orders   1.  ILD (interstitial lung disease) ([address]         Plan:   69year old male with PMH of HTN is here for evaluation of progressive SOB over two years and abnormal CT scan. SOB is mainly with exertion. It is associated with dry cough. ROS is not suggestive of specific illness. No joints, skin or eye conditions. No new or previous known exposures. 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 There is no new medication. He has a cat. He had it two years ago. He doesn't have birds and did not live or worked in a farm. PFT reviewed. There is severe restrictive disease based on TLC and severe reduction in diffusion capacity. CT scan reviewed. Interstitial changes there are not specific. It is upper lobes predominant. There may be early honeycombing in the upper lobes though there is no definite string of honeycombing. The distribution of interstitial changes is subpleural with geographic but no temporal distribution. These changes makes IPF less likely. Prior CT scan a year ago reviewed. There was minimal subpleural reticular, nodular changes at that time. DDx is broad   Will get sed rate, CRP, ELISHA with reflex, hypersensitivity panel, ANCA, RF and CCP Ab. Will also get CMP and CBC  I will also plan for bronchoscopy with BAL and transbronchial biopsies - risks and benefits discussed  If above mentioned workup is not diagnostic he may need open lung biopsy.       Spent over 60 minutes on this visit including history, physical exam, review of data, development and implementation of treatment plan and coordination of care

## 2020-06-11 DIAGNOSIS — J84.9 ILD (INTERSTITIAL LUNG DISEASE) (HCC): ICD-10-CM

## 2020-06-11 DIAGNOSIS — T78.49XA OTHER ALLERGY, INITIAL ENCOUNTER: ICD-10-CM

## 2020-06-11 LAB
A/G RATIO: 1.2 (ref 1.1–2.2)
ALBUMIN SERPL-MCNC: 4.1 G/DL (ref 3.4–5)
ALP BLD-CCNC: 90 U/L (ref 40–129)
ALT SERPL-CCNC: 18 U/L (ref 10–40)
ANION GAP SERPL CALCULATED.3IONS-SCNC: 13 MMOL/L (ref 3–16)
AST SERPL-CCNC: 42 U/L (ref 15–37)
BASOPHILS ABSOLUTE: 0.1 K/UL (ref 0–0.2)
BASOPHILS RELATIVE PERCENT: 0.8 %
BILIRUB SERPL-MCNC: 0.8 MG/DL (ref 0–1)
BUN BLDV-MCNC: 10 MG/DL (ref 7–20)
CALCIUM SERPL-MCNC: 8.8 MG/DL (ref 8.3–10.6)
CHLORIDE BLD-SCNC: 95 MMOL/L (ref 99–110)
CO2: 20 MMOL/L (ref 21–32)
CREAT SERPL-MCNC: 1.4 MG/DL (ref 0.8–1.3)
EOSINOPHILS ABSOLUTE: 0.3 K/UL (ref 0–0.6)
EOSINOPHILS RELATIVE PERCENT: 3.2 %
GFR AFRICAN AMERICAN: >60
GFR NON-AFRICAN AMERICAN: 50
GLOBULIN: 3.5 G/DL
GLUCOSE BLD-MCNC: 133 MG/DL (ref 70–99)
HCT VFR BLD CALC: 40.8 % (ref 40.5–52.5)
HEMOGLOBIN: 13.6 G/DL (ref 13.5–17.5)
LYMPHOCYTES ABSOLUTE: 1.9 K/UL (ref 1–5.1)
LYMPHOCYTES RELATIVE PERCENT: 23.1 %
MCH RBC QN AUTO: 32.5 PG (ref 26–34)
MCHC RBC AUTO-ENTMCNC: 33.3 G/DL (ref 31–36)
MCV RBC AUTO: 97.6 FL (ref 80–100)
MONOCYTES ABSOLUTE: 0.7 K/UL (ref 0–1.3)
MONOCYTES RELATIVE PERCENT: 8.1 %
NEUTROPHILS ABSOLUTE: 5.3 K/UL (ref 1.7–7.7)
NEUTROPHILS RELATIVE PERCENT: 64.8 %
PDW BLD-RTO: 15 % (ref 12.4–15.4)
PLATELET # BLD: 237 K/UL (ref 135–450)
PMV BLD AUTO: 8 FL (ref 5–10.5)
POTASSIUM SERPL-SCNC: 4.5 MMOL/L (ref 3.5–5.1)
RBC # BLD: 4.18 M/UL (ref 4.2–5.9)
RHEUMATOID FACTOR: <10 IU/ML
SEDIMENTATION RATE, ERYTHROCYTE: 38 MM/HR (ref 0–20)
SODIUM BLD-SCNC: 128 MMOL/L (ref 136–145)
TOTAL PROTEIN: 7.6 G/DL (ref 6.4–8.2)
WBC # BLD: 8.3 K/UL (ref 4–11)

## 2020-06-12 ENCOUNTER — OFFICE VISIT (OUTPATIENT)
Dept: PRIMARY CARE CLINIC | Age: 70
End: 2020-06-12

## 2020-06-12 ENCOUNTER — TELEPHONE (OUTPATIENT)
Dept: PULMONOLOGY | Age: 70
End: 2020-06-12

## 2020-06-12 LAB — ANTI-NUCLEAR ANTIBODY (ANA): NEGATIVE

## 2020-06-12 NOTE — TELEPHONE ENCOUNTER
Called and talked to pt wife, she is going to call him and have him drove down to [facility] to get covid test done before 12:00 today so that he can get Bronch on Tuesday.

## 2020-06-13 LAB
ANCA IFA: NORMAL
REPORT: NORMAL
SARS-COV-2: NOT DETECTED
THIS TEST SENT TO: NORMAL

## 2020-06-16 ENCOUNTER — APPOINTMENT (OUTPATIENT)
Dept: GENERAL RADIOLOGY | Age: 70
End: 2020-06-16
Attending: INTERNAL MEDICINE
Payer: MEDICARE

## 2020-06-16 ENCOUNTER — HOSPITAL ENCOUNTER (OUTPATIENT)
Age: 70
Setting detail: OUTPATIENT SURGERY
Discharge: HOME | End: 2020-06-16
Attending: INTERNAL MEDICINE | Admitting: INTERNAL MEDICINE
Payer: MEDICARE

## 2020-06-16 VITALS
TEMPERATURE: 96.3 F | HEART RATE: 100 BPM | BODY MASS INDEX: 23.62 KG/M2 | DIASTOLIC BLOOD PRESSURE: 64 MMHG | HEIGHT: 70 IN | OXYGEN SATURATION: 93 % | SYSTOLIC BLOOD PRESSURE: 122 MMHG | RESPIRATION RATE: 16 BRPM | WEIGHT: 165 LBS

## 2020-06-16 LAB
ALLERGEN BEEF: <0.1 KU/L
ALLERGEN PHOMA BETAE: <0.1 KU/L
ALLERGEN PORK: <0.1 KU/L
ALLERGEN SEE NOTE: NORMAL
ALLERGEN, FEATHER MIX: NEGATIVE KU/L
APPEARANCE BAL (LAVAGE): CLEAR
ASPERGILLUS FLAVUS ANTIBODIES: NORMAL
ASPERGILLUS FUMIGATUS #1: NORMAL
ASPERGILLUS FUMIGATUS #2: NORMAL
ASPERGILLUS FUMIGATUS #3: NORMAL
ASPERGILLUS FUMIGATUS #6: NORMAL
AUREOBASIDIUM PULLULANS: NORMAL
CLOT EVALUATION BAL: ABNORMAL
COLOR LAVAGE: COLORLESS
EOSIN: 3 %
LYMPHOCYTES, BAL: 14 % (ref 5–10)
MACROPHAGES, BAL: 72 % (ref 90–95)
MICROPOLYSPORA FAENI: NORMAL
NUMBER OF CELLS COUNTED BAL (LAVAGE): 100
PIGEON SERUM ABS: NORMAL
RBC, BAL: 12 /CUMM
SACCHAROMONOSPORA VIRIDIS: NORMAL
SEGMENTED NEUTROPHILS, BAL: 11 % (ref 5–10)
THERMOACTINOMYCES CANDIDUS AB: NORMAL
THERMOACTINOMYCES VULGARIS #1: NORMAL
WBC/EPI CELLS BAL: 472 /CUMM

## 2020-06-16 PROCEDURE — 87798 DETECT AGENT NOS DNA AMP: CPT

## 2020-06-16 PROCEDURE — 89051 BODY FLUID CELL COUNT: CPT

## 2020-06-16 PROCEDURE — 3609011800 HC BRONCHOSCOPY/TRANSBRONCHIAL LUNG BIOPSY: Performed by: INTERNAL MEDICINE

## 2020-06-16 PROCEDURE — 88112 CYTOPATH CELL ENHANCE TECH: CPT

## 2020-06-16 PROCEDURE — 87015 SPECIMEN INFECT AGNT CONCNTJ: CPT

## 2020-06-16 PROCEDURE — 87581 M.PNEUMON DNA AMP PROBE: CPT

## 2020-06-16 PROCEDURE — 71045 X-RAY EXAM CHEST 1 VIEW: CPT

## 2020-06-16 PROCEDURE — 87081 CULTURE SCREEN ONLY: CPT

## 2020-06-16 PROCEDURE — 2580000003 HC RX 258: Performed by: INTERNAL MEDICINE

## 2020-06-16 PROCEDURE — 2500000003 HC RX 250 WO HCPCS: Performed by: INTERNAL MEDICINE

## 2020-06-16 PROCEDURE — 31624 DX BRONCHOSCOPE/LAVAGE: CPT | Performed by: INTERNAL MEDICINE

## 2020-06-16 PROCEDURE — 87205 SMEAR GRAM STAIN: CPT

## 2020-06-16 PROCEDURE — 7100000011 HC PHASE II RECOVERY - ADDTL 15 MIN: Performed by: INTERNAL MEDICINE

## 2020-06-16 PROCEDURE — 99153 MOD SED SAME PHYS/QHP EA: CPT | Performed by: INTERNAL MEDICINE

## 2020-06-16 PROCEDURE — 7100000010 HC PHASE II RECOVERY - FIRST 15 MIN: Performed by: INTERNAL MEDICINE

## 2020-06-16 PROCEDURE — 87651 STREP A DNA AMP PROBE: CPT

## 2020-06-16 PROCEDURE — 87206 SMEAR FLUORESCENT/ACID STAI: CPT

## 2020-06-16 PROCEDURE — 88312 SPECIAL STAINS GROUP 1: CPT

## 2020-06-16 PROCEDURE — 87541 LEGION PNEUMO DNA AMP PROB: CPT

## 2020-06-16 PROCEDURE — 88305 TISSUE EXAM BY PATHOLOGIST: CPT

## 2020-06-16 PROCEDURE — 99152 MOD SED SAME PHYS/QHP 5/>YRS: CPT | Performed by: INTERNAL MEDICINE

## 2020-06-16 PROCEDURE — 87641 MR-STAPH DNA AMP PROBE: CPT

## 2020-06-16 PROCEDURE — 31628 BRONCHOSCOPY/LUNG BX EACH: CPT | Performed by: INTERNAL MEDICINE

## 2020-06-16 PROCEDURE — 87633 RESP VIRUS 12-25 TARGETS: CPT

## 2020-06-16 PROCEDURE — 87070 CULTURE OTHR SPECIMN AEROBIC: CPT

## 2020-06-16 PROCEDURE — 3609027000 HC BRONCHOSCOPY: Performed by: INTERNAL MEDICINE

## 2020-06-16 PROCEDURE — 6360000002 HC RX W HCPCS: Performed by: INTERNAL MEDICINE

## 2020-06-16 PROCEDURE — 87486 CHLMYD PNEUM DNA AMP PROBE: CPT

## 2020-06-16 PROCEDURE — 87116 MYCOBACTERIA CULTURE: CPT

## 2020-06-16 PROCEDURE — 2709999900 HC NON-CHARGEABLE SUPPLY: Performed by: INTERNAL MEDICINE

## 2020-06-16 RX ORDER — MIDAZOLAM HYDROCHLORIDE 1 MG/ML
INJECTION INTRAMUSCULAR; INTRAVENOUS PRN
Status: DISCONTINUED | OUTPATIENT
Start: 2020-06-16 | End: 2020-06-16 | Stop reason: ALTCHOICE

## 2020-06-16 RX ORDER — IBUPROFEN 200 MG
200 TABLET ORAL EVERY 12 HOURS PRN
COMMUNITY

## 2020-06-16 RX ORDER — LIDOCAINE HYDROCHLORIDE 20 MG/ML
5 INJECTION, SOLUTION EPIDURAL; INFILTRATION; INTRACAUDAL; PERINEURAL ONCE
Status: COMPLETED | OUTPATIENT
Start: 2020-06-16 | End: 2020-06-16

## 2020-06-16 RX ORDER — FENTANYL CITRATE 50 UG/ML
INJECTION, SOLUTION INTRAMUSCULAR; INTRAVENOUS PRN
Status: DISCONTINUED | OUTPATIENT
Start: 2020-06-16 | End: 2020-06-16 | Stop reason: ALTCHOICE

## 2020-06-16 RX ORDER — DEXTROSE AND SODIUM CHLORIDE 5; .9 G/100ML; G/100ML
INJECTION, SOLUTION INTRAVENOUS CONTINUOUS
Status: DISCONTINUED | OUTPATIENT
Start: 2020-06-16 | End: 2020-06-16 | Stop reason: HOSPADM

## 2020-06-16 RX ADMIN — LIDOCAINE HYDROCHLORIDE 5 ML: 20 INJECTION, SOLUTION EPIDURAL; INFILTRATION; INTRACAUDAL; PERINEURAL at 12:40

## 2020-06-16 RX ADMIN — DEXTROSE AND SODIUM CHLORIDE: 5; 900 INJECTION, SOLUTION INTRAVENOUS at 12:28

## 2020-06-16 ASSESSMENT — PAIN SCALES - GENERAL: PAINLEVEL_OUTOF10: 0

## 2020-06-16 ASSESSMENT — PAIN - FUNCTIONAL ASSESSMENT: PAIN_FUNCTIONAL_ASSESSMENT: 0-10

## 2020-06-16 ASSESSMENT — ENCOUNTER SYMPTOMS
ABDOMINAL PAIN: 0
VOMITING: 0
COUGH: 1
SHORTNESS OF BREATH: 1
WHEEZING: 0

## 2020-06-16 NOTE — PROGRESS NOTES
Chanel Lynn is a 69 y.o. male patient. Current Facility-Administered Medications   Medication Dose Route Frequency Provider Last Rate Last Dose    dextrose 5 % and 0.9 % sodium chloride infusion   Intravenous Continuous Natalia Clement MD         Allergies   Allergen Reactions    Hydrocodone-Acetaminophen Nausea And Vomiting    Percocet [Oxycodone-Acetaminophen] Nausea And Vomiting    Percodan [Oxycodone-Aspirin] Nausea And Vomiting     Active Problems:    * No active hospital problems. *  Resolved Problems:    * No resolved hospital problems. *    Blood pressure (!) 151/92, pulse 122, temperature 96.3 °F (35.7 °C), temperature source Temporal, resp. rate 19, height 5' 10\" (1.778 m), weight 165 lb (74.8 kg), SpO2 95 %. Subjective:  Symptoms:  Stable. Diet:  Adequate intake. Activity level: Normal.    Pain:  He reports no pain. Objective:  General Appearance:  Comfortable, well-appearing, in no acute distress and not in pain. Vital signs: (most recent): Blood pressure (!) 151/92, pulse 122, temperature 96.3 °F (35.7 °C), temperature source Temporal, resp. rate 19, height 5' 10\" (1.778 m), weight 165 lb (74.8 kg), SpO2 95 %. Vital signs are normal.    Output: Producing urine and producing stool. Lungs:  Normal effort. Heart: Tachycardia. Abdomen: Abdomen is soft. There is no abdominal tenderness. Extremities: Normal range of motion. Neurological: Patient is alert and oriented to person, place and time. Skin:  Warm and dry.       Assessment & Plan    Wade Yoon RN  6/16/2020

## 2020-06-16 NOTE — H&P
Pulmonary Consult    Patient's PCP: Vanesa Carter MD    HISTORY OF PRESENT ILLNESS:    This is a  69 y.o. male with PMH listed below under workup for ILD presented for bronch with biopsies. 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  Hyperlipidemia     Hypertension      Past Surgical History:   Procedure Laterality Date    CATARACT REMOVAL WITH IMPLANT Bilateral 2007    COLOSTOMY      diverticulitis    OTHER SURGICAL HISTORY Bilateral 2013    laser to remove membrane behind lens    REVISION COLOSTOMY      TUMOR EXCISION  2004    excision of basal cell. Medications Prior to Admission:    No current facility-administered medications on file prior to encounter. Current Outpatient Medications on File Prior to Encounter   Medication Sig Dispense Refill    ibuprofen (ADVIL;MOTRIN) 200 MG tablet Take 200 mg by mouth every 12 hours as needed for Pain 2-3 tablets      ipratropium-albuterol (DUONEB) 0.5-2.5 (3) MG/3ML SOLN nebulizer solution Inhale 3 mLs into the lungs every 6 hours as needed for Shortness of Breath 120 vial 1    losartan (COZAAR) 50 MG tablet Take 1 tablet by mouth daily 90 tablet 1    atorvastatin (LIPITOR) 10 MG tablet Take 1 tablet by mouth daily 90 tablet 2    metoprolol succinate (TOPROL XL) 25 MG extended release tablet Take 1 tablet by mouth daily 90 tablet 1    sildenafil (VIAGRA) 100 MG tablet Take 1 tablet by mouth daily as needed for Erectile Dysfunction 20 tablet 3       Allergies:  Hydrocodone-acetaminophen; Percocet [oxycodone-acetaminophen]; and Percodan [oxycodone-aspirin]    Social History:   TOBACCO:   reports that he quit smoking about 9 years ago. His smoking use included cigarettes.  He has a 64.50 pack-year smoking history. He has never used smokeless tobacco.     ETOH:   reports current alcohol use. Family History:       Problem Relation Age of Onset    Coronary Art Dis Brother          Review of Systems   Respiratory: Positive for cough and shortness of breath. Negative for wheezing. Cardiovascular: Negative for chest pain. Gastrointestinal: Negative for abdominal pain and vomiting. PHYSICAL EXAM:  /64   Pulse 100   Temp 96.3 °F (35.7 °C) (Temporal)   Resp 16   Ht 5' 10\" (1.778 m)   Wt 165 lb (74.8 kg)   SpO2 93%   BMI 23.68 kg/m²     Physical Exam  Vitals signs reviewed. Constitutional:       Appearance: He is well-developed. HENT:      Head: Normocephalic and atraumatic. Eyes:      Extraocular Movements: Extraocular movements intact. Pupils: Pupils are equal, round, and reactive to light. Neck:      Musculoskeletal: Neck supple. Vascular: No JVD. Cardiovascular:      Rate and Rhythm: Normal rate and regular rhythm. Heart sounds: No murmur. No friction rub. No gallop. Pulmonary:      Effort: Pulmonary effort is normal. No respiratory distress. Breath sounds: No stridor. Rales present. No wheezing. Abdominal:      General: Bowel sounds are normal.      Palpations: Abdomen is soft. Musculoskeletal:         General: No deformity. Right lower leg: No edema. Left lower leg: No edema. Skin:     General: Skin is warm and dry. Neurological:      Mental Status: He is alert and oriented to person, place, and time. Psychiatric:         Behavior: Behavior normal.         LABS:  No results for input(s): WBC, HGB, HCT, PLT in the last 72 hours. No results for input(s): NA, K, CL, CO2, BUN, CREATININE, GLUCOSE in the last 72 hours. Invalid input(s):  CA,  PHOS  No results for input(s): AST, ALT, ALB, BILITOT, ALKPHOS in the last 72 hours.   No results for input(s): TROPONINI in the last 72 hours. No results for input(s): BNP in the last 72 hours. No results found for: PHART, GPF1GHQ, PO2ART  No results for input(s): INR in the last 72 hours. @LABRCNT(NITRITE,COLORU,PHUR,LABCAST,WBCUA,RBCUA,MUCUS,TRICHOMONAS,YEAST,BACTERIA,CLARITYU,SPECGRAV,LEUKOCYTESUR,UROBILINOGEN,BILIRUBINUR,BLOODU,GLUCOSEU,KETONESU,AMORPHOUS)@       Assessment &Plan:    Patient Active Problem List:     Basal cell carcinoma     Essential hypertension     ILD (interstitial lung disease) ([facility] [address]      ILD under workup is here for bronch with BAL and transbronchial biopsies. ASA III  Mallampati II    The patient and / or the family were informed of the results of any tests, a time was given to answer questions, a plan was proposed and they agreed with plan. Thank you Dr. Aj Hernandez MD for the opportunity to be involved in this patients care.  If you have any questions or concerns please feel free to contact me  No Order

## 2020-06-18 LAB
CULTURE, RESPIRATORY: NORMAL
GRAM STAIN RESULT: NORMAL

## 2020-06-19 LAB — REPORT: NORMAL

## 2020-06-23 ENCOUNTER — TELEPHONE (OUTPATIENT)
Dept: PULMONOLOGY | Age: 70
End: 2020-06-23

## 2020-06-25 ENCOUNTER — VIRTUAL VISIT (OUTPATIENT)
Dept: PULMONOLOGY | Age: 70
End: 2020-06-25
Payer: MEDICARE

## 2020-06-25 LAB
FINAL REPORT: NORMAL
PRELIMINARY: NORMAL

## 2020-06-25 PROCEDURE — 3017F COLORECTAL CA SCREEN DOC REV: CPT | Performed by: INTERNAL MEDICINE

## 2020-06-25 PROCEDURE — 99214 OFFICE O/P EST MOD 30 MIN: CPT | Performed by: INTERNAL MEDICINE

## 2020-06-25 PROCEDURE — 4040F PNEUMOC VAC/ADMIN/RCVD: CPT | Performed by: INTERNAL MEDICINE

## 2020-06-25 PROCEDURE — 1123F ACP DISCUSS/DSCN MKR DOCD: CPT | Performed by: INTERNAL MEDICINE

## 2020-06-25 PROCEDURE — G8427 DOCREV CUR MEDS BY ELIG CLIN: HCPCS | Performed by: INTERNAL MEDICINE

## 2020-06-25 PROCEDURE — G8420 CALC BMI NORM PARAMETERS: HCPCS | Performed by: INTERNAL MEDICINE

## 2020-06-25 PROCEDURE — 1036F TOBACCO NON-USER: CPT | Performed by: INTERNAL MEDICINE

## 2020-06-25 RX ORDER — ALENDRONATE SODIUM 70 MG/1
70 TABLET ORAL
Qty: 5 TABLET | Refills: 2 | Status: SHIPPED | OUTPATIENT
Start: 2020-06-25 | End: 2020-09-08 | Stop reason: SDUPTHER

## 2020-06-25 RX ORDER — SULFAMETHOXAZOLE AND TRIMETHOPRIM 800; 160 MG/1; MG/1
1 TABLET ORAL DAILY
Qty: 30 TABLET | Refills: 1 | Status: SHIPPED | OUTPATIENT
Start: 2020-06-25 | End: 2020-08-24

## 2020-06-25 RX ORDER — PREDNISONE 20 MG/1
TABLET ORAL
Qty: 105 TABLET | Refills: 0 | Status: SHIPPED | OUTPATIENT
Start: 2020-06-25 | End: 2020-08-24

## 2020-06-25 ASSESSMENT — ENCOUNTER SYMPTOMS
NAUSEA: 0
VOMITING: 0
CHEST TIGHTNESS: 0
EYE ITCHING: 0
ABDOMINAL PAIN: 0
EYE REDNESS: 0
EYE PAIN: 0
SHORTNESS OF BREATH: 0
BACK PAIN: 0
COUGH: 0
WHEEZING: 0

## 2020-06-25 NOTE — PROGRESS NOTES
Pulmonology Video Visit    Pursuant to the emergency declaration under the [facility] waiver authority and the Coronavirus Preparedness and [facility] Appropriations Act this Video Visit was insisted, with patient's consent, to reduce the patient's risk of exposure to COVID-19 and provide continuity of care for an established patient. The patient was at home, while the provider was at the clinic. Services were provided through a synchronous discussion through a Video Visit to substitute for in-person clinic visit, and coded as such. [facility]    Outpatient Note    Subjective:   CHIEF COMPLAINT:   Chief Complaint   Patient presents with    Results     BRONCH     Interval history on 6/25/20  No new symptoms. No hemoptysis or worsening SOB after bronchoscopy     HPI:  6/5/20   The patient is 69 y.o. male is here for evaluation of abnormal CT  His main complain is SOB on exertion. This is going on for couple year. Slowly progressive. He was on an inhaler before but it didn't help. Currently he can walk about 2 blocks and can climb two flights of stairs without stopping. Has occasional cough, usually dry. There is no fever or chills. There is no new meds. He has a can over the past two year. There is no pain. He has hx of mild heart attack. Did not have any stent. 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 No exposures to fumes, chemicals or dust  No skin rash. No eye redness or pain. No joints pain, back pain or swellings. Former smoker. Used to smoke 1.5 PPD for 43 years. 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     Hyperlipidemia     Hypertension        Social History:    Social History     Tobacco Use   Smoking Status Former Smoker    Packs/day: 1.50    Years: 43.00    Pack years: 64.50    Types: Cigarettes    Last attempt to quit: 2011    Years since quittin.0   Smokeless Tobacco Never Used       Family History:  Family History   Problem Relation Age of Onset    Coronary Art Dis Brother        Current Medications:  Current Outpatient Medications on File Prior to Visit   Medication Sig Dispense Refill    ibuprofen (ADVIL;MOTRIN) 200 MG tablet Take 200 mg by mouth every 12 hours as needed for Pain 2-3 tablets      ipratropium-albuterol (DUONEB) 0.5-2.5 (3) MG/3ML SOLN nebulizer solution Inhale 3 mLs into the lungs every 6 hours as needed for Shortness of Breath 120 vial 1    losartan (COZAAR) 50 MG tablet Take 1 tablet by mouth daily 90 tablet 1    atorvastatin (LIPITOR) 10 MG tablet Take 1 tablet by mouth daily 90 tablet 2    metoprolol succinate (TOPROL XL) 25 MG extended release tablet Take 1 tablet by mouth daily 90 tablet 1    sildenafil (VIAGRA) 100 MG tablet Take 1 tablet by mouth daily as needed for Erectile Dysfunction 20 tablet 3     No current facility-administered medications on file prior to visit. REVIEW OF SYSTEMS:    Review of Systems   Constitutional: Positive for unexpected weight change (lost 5 lbs over years). Negative for chills, fatigue and fever. HENT: Negative for congestion and postnasal drip. Eyes: Negative for pain, redness and itching. Respiratory: Negative for cough, chest tightness, shortness of breath and wheezing. Cardiovascular: Negative for chest pain, palpitations and leg swelling. Gastrointestinal: Negative for abdominal pain, nausea and vomiting. Musculoskeletal: Negative for arthralgias, back pain and myalgias. Skin: Negative for rash. Neurological: Negative for weakness. Psychiatric/Behavioral: The patient is not nervous/anxious.     All other systems reviewed and are

## 2020-08-04 LAB
AFB CULTURE (MYCOBACTERIA): NORMAL
AFB SMEAR: NORMAL

## 2020-08-09 ENCOUNTER — PATIENT MESSAGE (OUTPATIENT)
Dept: PULMONOLOGY | Age: 70
End: 2020-08-09

## 2020-08-10 NOTE — TELEPHONE ENCOUNTER
From: Charlotte Cintron  To: Raj Lyons MD  Sent: 8/9/2020 10:41 PM EDT  Subject: Prescription Question    Have been taking 2 prednSone 20 mg, everyday, not realizing I had to 1and 1/2 after 30 days. Have 15 pills left, what should I do ? Sorry didnt read but the first line of that Rx 2 pills daily.

## 2020-08-24 ENCOUNTER — TELEPHONE (OUTPATIENT)
Dept: PRIMARY CARE CLINIC | Age: 70
End: 2020-08-24

## 2020-08-24 ENCOUNTER — NURSE TRIAGE (OUTPATIENT)
Dept: OTHER | Facility: CLINIC | Age: 70
End: 2020-08-24

## 2020-08-24 ENCOUNTER — OFFICE VISIT (OUTPATIENT)
Dept: PRIMARY CARE CLINIC | Age: 70
End: 2020-08-24
Payer: MEDICARE

## 2020-08-24 VITALS
SYSTOLIC BLOOD PRESSURE: 138 MMHG | DIASTOLIC BLOOD PRESSURE: 72 MMHG | HEART RATE: 102 BPM | TEMPERATURE: 97.7 F | OXYGEN SATURATION: 98 %

## 2020-08-24 PROCEDURE — 99213 OFFICE O/P EST LOW 20 MIN: CPT | Performed by: FAMILY MEDICINE

## 2020-08-24 PROCEDURE — G8420 CALC BMI NORM PARAMETERS: HCPCS | Performed by: FAMILY MEDICINE

## 2020-08-24 PROCEDURE — G8428 CUR MEDS NOT DOCUMENT: HCPCS | Performed by: FAMILY MEDICINE

## 2020-08-24 PROCEDURE — 3017F COLORECTAL CA SCREEN DOC REV: CPT | Performed by: FAMILY MEDICINE

## 2020-08-24 PROCEDURE — 4040F PNEUMOC VAC/ADMIN/RCVD: CPT | Performed by: FAMILY MEDICINE

## 2020-08-24 PROCEDURE — 1036F TOBACCO NON-USER: CPT | Performed by: FAMILY MEDICINE

## 2020-08-24 PROCEDURE — 1123F ACP DISCUSS/DSCN MKR DOCD: CPT | Performed by: FAMILY MEDICINE

## 2020-08-24 RX ORDER — PREDNISONE 20 MG/1
20 TABLET ORAL DAILY
Qty: 90 TABLET | Refills: 0 | COMMUNITY
Start: 2020-08-24 | End: 2020-08-26

## 2020-08-24 RX ORDER — HYDROCHLOROTHIAZIDE 12.5 MG/1
12.5 CAPSULE, GELATIN COATED ORAL EVERY MORNING
Qty: 30 CAPSULE | Refills: 5 | Status: SHIPPED | OUTPATIENT
Start: 2020-08-24

## 2020-08-24 RX ORDER — M-VIT,TX,IRON,MINS/CALC/FOLIC 27MG-0.4MG
1 TABLET ORAL DAILY
COMMUNITY

## 2020-08-24 RX ORDER — PANTOPRAZOLE SODIUM 40 MG/1
40 TABLET, DELAYED RELEASE ORAL DAILY
COMMUNITY

## 2020-08-24 NOTE — TELEPHONE ENCOUNTER
Patient called [facility]) to schedule appointment, with red flag complaint, transferred to RN access for triage     Soft transfer to [facility] in pre-service center to schedule appointment as recommended by Latisha Villanueva NP at [phone]. Care advice as documented. Pts wife verbalized understanding and is agreeable to plan. I called pt to schedule. Pt wife refuses in person appointment due to pt being a fall risk.  I schedule pt for a VV please advise if this is ok or does pt need to reschedule

## 2020-08-24 NOTE — PROGRESS NOTES
Subjective:      Patient ID: Placido Pierce is a 69 y.o. male. HPIFell at home 10 days ago, got jim in the scalp. Marti Gordon again the next day , had melena so went to [facility] where he had EGD that showed a 10 mm DU. Rec'd 3 units PRBCs, and the subsequent scope was better. Has stopped drinking, now on Protonix   BP meds were held on DC  Ankles have been swelling - worse since he left the hosp  Review of Systems    Objective:   Physical Exam  HENT:      Head:      Comments: Herrera facies  Cardiovascular:      Rate and Rhythm: Normal rate and regular rhythm. Pulmonary:      Effort: Pulmonary effort is normal.      Breath sounds: Normal breath sounds. Musculoskeletal:      Right lower le+ Pitting Edema present. Left lower le+ Pitting Edema present.        Vitals:    20 1518   BP: 138/72   Pulse: 102   Temp: 97.7 °F (36.5 °C)   SpO2: 98%       Assessment:    HTN controlled  Duodenal ulcer    IPF  Plan:    Start HCTZ 12.5 mg daily when your syst BP ( top number ) reaches 248 OR the diastolic ( bottom number) reaches 95  3 months        Dalila Chan MD

## 2020-08-24 NOTE — TELEPHONE ENCOUNTER
Reason for Disposition   Thigh, calf, or ankle swelling in both legs, but one side is definitely more swollen    Answer Assessment - Initial Assessment Questions  1. ONSET: \"When did the swelling start? \" (e.g., minutes, hours, days)      Pt was admitted to hospital a week ago. BP meds were stopped. DC with home health but wife has noticed ankles and feet are very swollen. 2. LOCATION: \"What part of the leg is swollen? \"  \"Are both legs swollen or just one leg? \"       BLLE feet and ankles but R is creeping up the leg towards knee about 4-5 inches     3. SEVERITY: \"How bad is the swelling? \" (e.g., localized; mild, moderate, severe)   - Localized - small area of swelling localized to one leg   - MILD pedal edema - swelling limited to foot and ankle, pitting edema < 1/4 inch (6 mm) deep, rest and elevation eliminate most or all swelling   - MODERATE edema - swelling of lower leg to knee, pitting edema > 1/4 inch (6 mm) deep, rest and elevation only partially reduce swelling   - SEVERE edema - swelling extends above knee, facial or hand swelling present          Mild with pitting     4. REDNESS: \"Does the swelling look red or infected? \"          Denies    5. PAIN: \"Is the swelling painful to touch? \" If so, ask: \"How painful is it? \"   (Scale 1-10; mild, moderate or severe)             Denies    6. FEVER: \"Do you have a fever? \" If so, ask: \"What is it, how was it measured, and when did it start? \"            Denies    7. CAUSE: \"What do you think is causing the leg swelling? \"            All BP meds were stopped     8. MEDICAL HISTORY: \"Do you have a history of heart failure, kidney disease, liver failure, or cancer? \"            Denies all the above     9. RECURRENT SYMPTOM: \"Have you had leg swelling before? \" If so, ask: \"When was the last time? \" \"What happened that time? \"            Denies    10. OTHER SYMPTOMS: \"Do you have any other symptoms? \" (e.g., chest pain, difficulty breathing)             SOB but has not increased from norm, denies CP     11. PREGNANCY: \"Is there any chance you are pregnant? \" \"When was your last menstrual period? \"          NA    Protocols used: LEG SWELLING AND EDEMA-ADULT-OH    Patient called [facility]) to schedule appointment, with red flag complaint, transferred to RN access for triage. Caller reports symptoms as documented above. Caller informed of disposition. Soft transfer to [facility] in pre-service center to schedule appointment as recommended by Latisha Villanueva NP at [phone]. Care advice as documented. Pts wife verbalized understanding and is agreeable to plan. Please do not respond to the triage nurse through this encounter. Any subsequent communication should be directly with the patient.

## 2020-08-26 ENCOUNTER — TELEPHONE (OUTPATIENT)
Dept: PRIMARY CARE CLINIC | Age: 70
End: 2020-08-26

## 2020-08-26 ENCOUNTER — TELEPHONE (OUTPATIENT)
Dept: PULMONOLOGY | Age: 70
End: 2020-08-26

## 2020-08-26 NOTE — TELEPHONE ENCOUNTER
Please advise patient is running out of Prednisone. And his appt is sept 8th and will need some to hold him over until then.    [facility] [address]

## 2020-08-26 NOTE — TELEPHONE ENCOUNTER
Pau with [facility]  Calling to report that the  Pt was evaluated  for OT on 8/25/20

## 2020-09-02 LAB
BASOPHILS ABSOLUTE: 0 /ΜL
BASOPHILS RELATIVE PERCENT: 0 %
EOSINOPHILS ABSOLUTE: 0.1 /ΜL
EOSINOPHILS RELATIVE PERCENT: 1 %
HCT VFR BLD CALC: 31.1 % (ref 41–53)
HEMOGLOBIN: 10.1 G/DL (ref 13.5–17.5)
LYMPHOCYTES ABSOLUTE: 2.7 /ΜL
LYMPHOCYTES RELATIVE PERCENT: 29 %
MCH RBC QN AUTO: 31.4 PG
MCHC RBC AUTO-ENTMCNC: 32.5 G/DL
MCV RBC AUTO: 97 FL
MONOCYTES ABSOLUTE: 0.5 /ΜL
MONOCYTES RELATIVE PERCENT: 6 %
NEUTROPHILS ABSOLUTE: 5.8 /ΜL
NEUTROPHILS RELATIVE PERCENT: 63 %
PLATELET # BLD: 237 K/ΜL
PMV BLD AUTO: ABNORMAL FL
RBC # BLD: 3.22 10^6/ΜL
WBC # BLD: 9.2 10^3/ML

## 2020-09-08 ENCOUNTER — VIRTUAL VISIT (OUTPATIENT)
Dept: PULMONOLOGY | Age: 70
End: 2020-09-08
Payer: MEDICARE

## 2020-09-08 PROCEDURE — 1111F DSCHRG MED/CURRENT MED MERGE: CPT | Performed by: INTERNAL MEDICINE

## 2020-09-08 PROCEDURE — 99442 PR PHYS/QHP TELEPHONE EVALUATION 11-20 MIN: CPT | Performed by: INTERNAL MEDICINE

## 2020-09-08 RX ORDER — ALENDRONATE SODIUM 70 MG/1
70 TABLET ORAL
Qty: 5 TABLET | Refills: 1 | Status: SHIPPED | OUTPATIENT
Start: 2020-09-08 | End: 2020-09-09 | Stop reason: SDUPTHER

## 2020-09-08 RX ORDER — PREDNISONE 20 MG/1
20 TABLET ORAL DAILY
Qty: 60 TABLET | Refills: 1 | Status: SHIPPED | OUTPATIENT
Start: 2020-09-08 | End: 2020-09-09 | Stop reason: SDUPTHER

## 2020-09-08 RX ORDER — SULFAMETHOXAZOLE AND TRIMETHOPRIM 400; 80 MG/1; MG/1
1 TABLET ORAL DAILY
Qty: 60 TABLET | Refills: 0 | Status: SHIPPED | OUTPATIENT
Start: 2020-09-08 | End: 2020-09-09 | Stop reason: SDUPTHER

## 2020-09-08 ASSESSMENT — ENCOUNTER SYMPTOMS
NAUSEA: 0
COUGH: 0
EYE PAIN: 0
SHORTNESS OF BREATH: 0
WHEEZING: 0
VOMITING: 0
ABDOMINAL PAIN: 0
EYE REDNESS: 0
BACK PAIN: 0
EYE ITCHING: 0
CHEST TIGHTNESS: 0

## 2020-09-08 NOTE — PROGRESS NOTES
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  Hyperlipidemia     Hypertension        Social History:    Social History     Tobacco Use   Smoking Status Former Smoker    Packs/day: 1.50    Years: 43.00    Pack years: 64.50    Types: Cigarettes    Last attempt to quit: 2011    Years since quittin.2   Smokeless Tobacco Never Used       Family History:  Family History   Problem Relation Age of Onset    Coronary Art Dis Brother        Current Medications:  Current Outpatient Medications on File Prior to Visit   Medication Sig Dispense Refill    cyanocobalamin 1000 MCG tablet Take 1,000 mcg by mouth daily      Multiple Vitamins-Minerals (THERAPEUTIC MULTIVITAMIN-MINERALS) tablet Take 1 tablet by mouth daily      pantoprazole (PROTONIX) 40 MG tablet Take 40 mg by mouth daily      hydroCHLOROthiazide (MICROZIDE) 12.5 MG capsule Take 1 capsule by mouth every morning 30 capsule 5    ibuprofen (ADVIL;MOTRIN) 200 MG tablet Take 200 mg by mouth every 12 hours as needed for Pain 2-3 tablets      ipratropium-albuterol (DUONEB) 0.5-2.5 (3) MG/3ML SOLN nebulizer solution Inhale 3 mLs into the lungs every 6 hours as needed for Shortness of Breath 120 vial 1    atorvastatin (LIPITOR) 10 MG tablet Take 1 tablet by mouth daily 90 tablet 2    sildenafil (VIAGRA) 100 MG tablet Take 1 tablet by mouth daily as needed for Erectile Dysfunction 20 tablet 3     No current facility-administered medications on file prior to visit. REVIEW OF SYSTEMS:    Review of Systems   Constitutional: Negative for chills, fatigue, fever and unexpected weight change (lost 5 lbs over years). HENT: Negative for congestion and postnasal drip. Eyes: Negative for pain, redness and itching.    Respiratory: Negative for     HEART AND GREAT VESSELS: Heart size is normal.  Calcification of the thoracic aorta. No aneurysm. Marked coronary artery calcification. No pericardial effusion.       ADENOPATHY/MEDIASTINUM: No adenopathy.       CHEST WALL / LOWER NECK: No significant abnormality. Assessment:      Diagnosis Orders   1. ILD (interstitial lung disease) (HCC)  CT CHEST HIGH RESOLUTION    Full PFT Study Without Bronchdilator    Carbon Monoxide Diffusing Capacity       Plan:   69year old male with ILD. CT scan show upper lobes predominant interstitial changes. There is significant progression between 7/26/19 and 6/1/20. ROS is not suggestive of specific illness. No joints, skin or eye conditions. No new or previous known exposures. 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 He has a cat. He had it for two years. He doesn't have birds and did not live or work in a farm. PFT show severe restrictive disease based on TLC and severe reduction in diffusion capacity. Hypersensitivity panel is negative   ELISHA is negative   RF is n<10  Sed rate is 38  Bronchoscopic transbronchial biopsy in non diagnostic   BAL does not suggest an infectious process. Diatherix is positive for pseudomonas aeruginosa. This is likely colonization rather than true infection given the CT findings. He is on prednisone 20mg daily at this time. He was started on prednisone 40mg at the end of June 2020. There is no improvement or worsening of symptoms over the past 3 months. I discussed with him the option of getting lung biopsy however he doesn't want to go through that rout yet. Plan to continue prednisone 20mg daily for two more months and then get f/u CT scan and LFT. Continue bactrim and fosamax prophylaxis.

## 2020-09-09 RX ORDER — ALENDRONATE SODIUM 70 MG/1
70 TABLET ORAL
Qty: 5 TABLET | Refills: 1 | Status: SHIPPED | OUTPATIENT
Start: 2020-09-09 | End: 2020-09-14

## 2020-09-09 RX ORDER — SULFAMETHOXAZOLE AND TRIMETHOPRIM 400; 80 MG/1; MG/1
1 TABLET ORAL DAILY
Qty: 60 TABLET | Refills: 0 | Status: SHIPPED | OUTPATIENT
Start: 2020-09-09 | End: 2020-11-08

## 2020-09-09 RX ORDER — PREDNISONE 20 MG/1
20 TABLET ORAL DAILY
Qty: 60 TABLET | Refills: 1 | Status: SHIPPED | OUTPATIENT
Start: 2020-09-09 | End: 2020-11-19 | Stop reason: ALTCHOICE

## 2020-09-09 NOTE — TELEPHONE ENCOUNTER
Previous prescriptions were sent to incorrect pharmacy. These should go to [facility] in [facility].

## 2020-09-10 LAB
HCT VFR BLD CALC: 34.9 % (ref 38.5–50)
HEMOGLOBIN: 11.3 G/DL (ref 13.2–17.1)
MCH RBC QN AUTO: 32.1 PG (ref 27–33)
MCHC RBC AUTO-ENTMCNC: 32.3 G/DL (ref 32–36)
MCV RBC AUTO: 99.2 FL (ref 80–100)
PDW BLD-RTO: 17.1 % (ref 11–15)
PLATELET # BLD: 171 10E3/UL (ref 140–400)
PMV BLD AUTO: 9.5 FL (ref 7.5–11.5)
RBC # BLD: 3.52 10E6/UL (ref 4.2–5.8)
WBC # BLD: 13.1 10E3/UL (ref 3.8–10.8)

## 2020-09-12 ENCOUNTER — TELEPHONE (OUTPATIENT)
Dept: PRIMARY CARE CLINIC | Age: 70
End: 2020-09-12

## 2020-09-22 ENCOUNTER — TELEPHONE (OUTPATIENT)
Dept: PRIMARY CARE CLINIC | Age: 70
End: 2020-09-22

## 2020-09-22 NOTE — TELEPHONE ENCOUNTER
----- Message from Srinivasan Brewster sent at 9/22/2020  2:47 PM EDT -----  Subject: Message to Provider    QUESTIONS  Information for Provider? Home Care provider [facility] #[phone] says   patient has informed her he no longer needs to have his blood lab's drawn   weekly and to follow up in 2 months. She wants to confirm with Dr. Melvi Sigala   to discontinue lab work.   ---------------------------------------------------------------------------  --------------  [address]  What is the best way for the office to contact you? OK to leave message on   voicemail  Preferred Call Back Phone Number? [phone]  ---------------------------------------------------------------------------  --------------  SCRIPT ANSWERS  Relationship to Patient? Other  Representative Name? [address]  Is the Representative on the appropriate HIPAA document in Epic?  Yes

## 2020-09-23 ENCOUNTER — PATIENT MESSAGE (OUTPATIENT)
Dept: PRIMARY CARE CLINIC | Age: 70
End: 2020-09-23
Payer: MEDICARE

## 2020-09-23 PROCEDURE — 96372 THER/PROPH/DIAG INJ SC/IM: CPT | Performed by: FAMILY MEDICINE

## 2020-09-23 RX ORDER — CYANOCOBALAMIN 1000 UG/ML
1000 INJECTION INTRAMUSCULAR; SUBCUTANEOUS
Qty: 10 ML | Refills: 0 | Status: SHIPPED | OUTPATIENT
Start: 2020-09-23 | End: 2021-06-22 | Stop reason: SDUPTHER

## 2020-09-23 NOTE — TELEPHONE ENCOUNTER
From: Maria Elena Dawson  To: Andreas Solis MD  Sent: 9/23/2020 8:23 AM EDT  Subject: Prescription Question    Was put on B-12 after leaving the hospital. Had injections for the first week out of the hospital. Put on the oral meds after the week, but was never told the dosage or how often to take . ?

## 2020-09-24 ENCOUNTER — PATIENT MESSAGE (OUTPATIENT)
Dept: PRIMARY CARE CLINIC | Age: 70
End: 2020-09-24

## 2020-09-25 RX ORDER — SYRINGE W-NEEDLE,DISPOSAB,3 ML 25GX5/8"
SYRINGE, EMPTY DISPOSABLE MISCELLANEOUS
Qty: 3 EACH | Refills: 5 | Status: SHIPPED | OUTPATIENT
Start: 2020-09-25 | End: 2020-09-29 | Stop reason: SDUPTHER

## 2020-09-25 NOTE — TELEPHONE ENCOUNTER
From: Ar Sigala  To:  Gisell Ray MD  Sent: 9/24/2020 5:26 PM EDT  Subject: Non-Urgent Medical Question    22gauge needle 1/2 inch needle , 3cc syringe can call into [facility]

## 2020-10-05 ENCOUNTER — TELEPHONE (OUTPATIENT)
Dept: PRIMARY CARE CLINIC | Age: 70
End: 2020-10-05

## 2020-10-05 NOTE — TELEPHONE ENCOUNTER
Dominic Fajardo is calling to see if we have received 8/21/20  plan of care that was faxed on 10/2/20. Please call her back.     LOV 8/24/20

## 2020-10-06 ENCOUNTER — TELEPHONE (OUTPATIENT)
Dept: ADMINISTRATIVE | Age: 70
End: 2020-10-06

## 2020-10-07 ENCOUNTER — TELEPHONE (OUTPATIENT)
Dept: PRIMARY CARE CLINIC | Age: 70
End: 2020-10-07
Payer: MEDICARE

## 2020-10-07 PROCEDURE — G0180 MD CERTIFICATION HHA PATIENT: HCPCS | Performed by: FAMILY MEDICINE

## 2020-11-03 ENCOUNTER — OFFICE VISIT (OUTPATIENT)
Dept: PRIMARY CARE CLINIC | Age: 70
End: 2020-11-03
Payer: MEDICARE

## 2020-11-03 PROCEDURE — 99211 OFF/OP EST MAY X REQ PHY/QHP: CPT | Performed by: NURSE PRACTITIONER

## 2020-11-04 LAB — SARS-COV-2: NOT DETECTED

## 2020-11-04 NOTE — RESULT ENCOUNTER NOTE

## 2020-11-09 ENCOUNTER — HOSPITAL ENCOUNTER (OUTPATIENT)
Dept: CT IMAGING | Age: 70
Discharge: HOME | End: 2020-11-09
Payer: MEDICARE

## 2020-11-09 ENCOUNTER — HOSPITAL ENCOUNTER (OUTPATIENT)
Dept: PULMONOLOGY | Age: 70
Discharge: HOME | End: 2020-11-09
Payer: MEDICARE

## 2020-11-09 PROCEDURE — 94664 DEMO&/EVAL PT USE INHALER: CPT

## 2020-11-09 PROCEDURE — 71250 CT THORAX DX C-: CPT

## 2020-11-09 PROCEDURE — 94760 N-INVAS EAR/PLS OXIMETRY 1: CPT

## 2020-11-09 PROCEDURE — 94060 EVALUATION OF WHEEZING: CPT

## 2020-11-09 PROCEDURE — 6360000002 HC RX W HCPCS: Performed by: INTERNAL MEDICINE

## 2020-11-09 PROCEDURE — 94729 DIFFUSING CAPACITY: CPT

## 2020-11-09 PROCEDURE — 94726 PLETHYSMOGRAPHY LUNG VOLUMES: CPT

## 2020-11-09 RX ORDER — ALBUTEROL SULFATE 2.5 MG/3ML
2.5 SOLUTION RESPIRATORY (INHALATION) ONCE
Status: COMPLETED | OUTPATIENT
Start: 2020-11-09 | End: 2020-11-09

## 2020-11-09 RX ADMIN — ALBUTEROL SULFATE 2.5 MG: 2.5 SOLUTION RESPIRATORY (INHALATION) at 18:19

## 2020-11-19 RX ORDER — PREDNISONE 1 MG/1
15 TABLET ORAL DAILY
Qty: 90 TABLET | Refills: 1 | Status: SHIPPED | OUTPATIENT
Start: 2020-11-19 | End: 2020-12-19

## 2020-12-08 ENCOUNTER — VIRTUAL VISIT (OUTPATIENT)
Dept: PULMONOLOGY | Age: 70
End: 2020-12-08
Payer: MEDICARE

## 2020-12-08 PROCEDURE — 99442 PR PHYS/QHP TELEPHONE EVALUATION 11-20 MIN: CPT | Performed by: INTERNAL MEDICINE

## 2020-12-08 RX ORDER — PREDNISONE 1 MG/1
10 TABLET ORAL DAILY
Qty: 60 TABLET | Refills: 0 | Status: SHIPPED | OUTPATIENT
Start: 2020-12-08 | End: 2021-01-07

## 2020-12-08 ASSESSMENT — ENCOUNTER SYMPTOMS
CHEST TIGHTNESS: 0
ABDOMINAL PAIN: 0
VOMITING: 0
EYE PAIN: 0
COUGH: 0
EYE REDNESS: 0
BACK PAIN: 0
EYE ITCHING: 0
NAUSEA: 0
SHORTNESS OF BREATH: 0
WHEEZING: 0

## 2020-12-08 NOTE — PROGRESS NOTES
Pulmonology Telephone Visit    Pursuant to the emergency declaration under the [address] [facility], 1135 waiver authority and the [employer] General Act this Telephone Visit was insisted, with patient's consent, to reduce the patient's risk of exposure to COVID-19 and provide continuity of care for an established patient. The patient was at home, while the provider was at the clinic. Services were provided through a synchronous discussion through a Telephone Call to substitute for in-person clinic visit, and coded as such. Total time spent with patient was 12 minutes. [facility]    Outpatient Note    Subjective:   CHIEF COMPLAINT:   Chief Complaint   Patient presents with    Follow-up     Interval history on 12/8/20  Breathing is better. He is currently on 15 mg daily. It was decreased on 11/19  There is no fever or chills. There is no joints pain or swelling. Interval history on 9/8/20  He is on prednisone, currently on 20mg. He is complaining of puffy face, some leg swelling and feels hungry all the time. Breathing is more or less the same, does not feel better or worse since I saw him last.      Interval history on 6/25/20  No new symptoms. No hemoptysis or worsening SOB after bronchoscopy     HPI:  6/5/20   The patient is 69 y.o. male is here for evaluation of abnormal CT  His main complain is SOB on exertion. This is going on for couple year. Slowly progressive. He was on an inhaler before but it didn't help. Currently he can walk about 2 blocks and can climb two flights of stairs without stopping. Has occasional cough, usually dry. There is no fever or chills. There is no new meds. He has a can over the past two year. There is no pain. He has hx of mild heart attack. Did not have any stent. 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     No exposures to fumes, chemicals or dust  No skin rash. No eye redness or pain. No joints pain, back pain or swellings. Former smoker. Used to smoke 1.5 PPD for 43 years. 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  Hyperlipidemia     Hypertension        Social History:    Social History     Tobacco Use   Smoking Status Former Smoker    Packs/day: 1.50    Years: 43.00    Pack years: 64.50    Types: Cigarettes    Last attempt to quit: 2011    Years since quittin.4   Smokeless Tobacco Never Used       Family History:  Family History   Problem Relation Age of Onset    Coronary Art Dis Brother        Current Medications:  Current Outpatient Medications on File Prior to Visit   Medication Sig Dispense Refill    predniSONE (DELTASONE) 5 MG tablet Take 3 tablets by mouth daily 90 tablet 1    Syringe/Needle, Disp, (SYRINGE 3CC/25GX1\") 25G X 1\" 3 ML MISC Use to inject B12 IM once a month.  4 each 5    cyanocobalamin 1000 MCG/ML injection Inject 1 mL into the muscle every 30 days 10 mL 0    cyanocobalamin 1000 MCG tablet Take 1,000 mcg by mouth daily      Multiple Vitamins-Minerals (THERAPEUTIC MULTIVITAMIN-MINERALS) tablet Take 1 tablet by mouth daily      pantoprazole (PROTONIX) 40 MG tablet Take 40 mg by mouth daily      hydroCHLOROthiazide (MICROZIDE) 12.5 MG capsule Take 1 capsule by mouth every morning 30 capsule 5    ibuprofen (ADVIL;MOTRIN) 200 MG tablet Take 200 mg by mouth every 12 hours as needed for Pain 2-3 tablets      ipratropium-albuterol (DUONEB) 0.5-2.5 (3) MG/3ML SOLN nebulizer solution Inhale 3 mLs into the lungs every 6 hours as needed for Shortness of Breath 120 vial 1    atorvastatin (LIPITOR) 10 MG tablet Take 1 tablet by mouth daily 90 tablet 2    sildenafil (VIAGRA) 100 MG tablet Take 1 tablet by mouth daily as needed for Erectile Dysfunction 20 tablet 3     No current facility-administered medications on file prior to visit. REVIEW OF SYSTEMS:    Review of Systems   Constitutional: Negative for chills, fatigue, fever and unexpected weight change (lost 5 lbs over years). HENT: Negative for congestion and postnasal drip. Eyes: Negative for pain, redness and itching. Respiratory: Negative for cough, chest tightness, shortness of breath and wheezing. Cardiovascular: Negative for chest pain, palpitations and leg swelling. Gastrointestinal: Negative for abdominal pain, nausea and vomiting. Musculoskeletal: Negative for arthralgias, back pain and myalgias. Skin: Negative for rash. Neurological: Negative for weakness. Psychiatric/Behavioral: The patient is not nervous/anxious. All other systems reviewed and are negative. Objective:   PHYSICAL EXAM:        VITALS:  There were no vitals taken for this visit. Physical Exam    DATA:    PFT on 5/18/20  FEV1/FVC is 71 with predicted ratio of 73. Post bronchodilator FEV1/FVC is 77  FEV1 is 1.87L which is 58% predicted - improved to 2. 02L with 8% reversibility  FVC is 2.62 which is 60% predicted   TLC is 3.96 which is 56% predicted   RV is 1.31 which is 49% predicted  DLCO is 11.42 which is 39% predicted     CT chest on 6/1/20  The predominant pattern is irregular reticulation type subpleural fibrosis which is more prominent in the upper lobes but also involves the lower lobes. There is early honeycombing in the right upper lobe. Primarily due to the distribution, CT findings    are not diagnostic for a UIP pattern. Differential considerations include hypersensitivity pneumonitis, sarcoidosis, nonspecific interstitial pneumonitis, and less likely pneumoconiosis but idiopathic pulmonary fibrosis remains a primary consideration    particularly given the rapid progression since the prior study. LDCT on 7/26/19  FINDINGS:       LUNGS AND AIRWAYS: Airways are patent.  No lobar consolidation. Mild reticulation in the subpleural regions most pronounced about the upper lobes may relate to pneumonitis. Pulmonary nodule right apex axial image 37 oval solid measures 6 x 3 mm. Subpleural pulmonary nodules at the apex measuring less than 5 mm axial image 47.       PLEURA: Mild biapical pleural thickening likely relates to fibrosis. No pleural fluid.       HEART AND GREAT VESSELS: Heart size is normal.  Calcification of the thoracic aorta. No aneurysm. Marked coronary artery calcification. No pericardial effusion.       ADENOPATHY/MEDIASTINUM: No adenopathy.       CHEST WALL / LOWER NECK: No significant abnormality. CT chest on 11/9/20  Significant improvement in appearance of the lungs, with resolution of coarse groundglass/nodular peripheral opacities seen on the prior study. Relatively mild nonspecific subpleural fibrosis with early right upper lobe honeycombing is stable. Pulmonary Function Test: 11/13/2020     Indication: Interstitial lung disease     Smoked for 41 years     Test comment:     Spirometry data is acceptable and reproducible.     Maximum effort given during the test.     Pulse ox is 96% on room air     Estimated body mass index is 23.68 kg/m² as calculated from the   following:     Height as of 6/16/20: 5' 10\" (1.778 m).     Weight as of 6/16/20: 165 lb (74.8 kg).      Spirometry data:     FEV1/FVC: 81. Predicted ratio 73     Pre-Bronchodilator FEV1 2.47L, which is 73% predicted     Post-Bronchodilator FEV1 2.52L, which is 75% predicted     There is 2% reversibility     FVC is 3.06L, which is 66% predicted     Lung Volumes:     TLC (by Plethysmography) is 4.89L, which is 67% predicted     RV is 1.76L which is 68% predicted     Diffusion Capacity:     DLCO is 16.73 which is 50% predicted     Impression:     1. There is no obstruction present     2. There is no significant reversibility with bronchodilator         [Increase in FEV1 => 12% of control and => 200 ml]     3. There is no significant hyperinflation / air trapping     4. There is moderate restriction     4. There is moderate reduction in diffusion capacity     Comment:   Moderate restrictive physiology consistent with the above   mentioned diagnosis of ILD     Assessment:      Diagnosis Orders   1. ILD (interstitial lung disease) ([facility] [address]         Plan:   69year old male with ILD. CT scan show upper lobes predominant interstitial changes. There is significant progression between 7/26/19 and 6/1/20. ROS is not suggestive of specific illness. No joints, skin or eye conditions. No new or previous known exposures. 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 He has a cat. He had it for two years. He doesn't have birds and did not live or work in a farm. PFT show severe restrictive disease based on TLC and severe reduction in diffusion capacity. Hypersensitivity panel is negative   ELISHA is negative   RF is n<10  Sed rate is 38  Bronchoscopic transbronchial biopsy in non diagnostic   BAL does not suggest an infectious process. Diatherix is positive for pseudomonas aeruginosa. This is likely colonization rather than true infection given the CT findings. He is on prednisone 15mg daily at this time, decreased from 20 on 11/19. He was started on prednisone 40mg at the end of June 2020. Overall CT scan looks much better. PFT also improved. Will decrease prednisone to 10mg daily on 12/19, and keep him on 10 mg for at least two months before we step down further.   I will see him again in one month    D/c alendronate and bactrim since prednisone dose is now below 20mg

## 2021-01-19 ENCOUNTER — TELEPHONE (OUTPATIENT)
Dept: PULMONOLOGY | Age: 71
End: 2021-01-19

## 2021-01-20 RX ORDER — PREDNISONE 10 MG/1
10 TABLET ORAL DAILY
Qty: 30 TABLET | Refills: 0 | Status: SHIPPED | OUTPATIENT
Start: 2021-01-20 | End: 2021-02-02 | Stop reason: SDUPTHER

## 2021-02-02 ENCOUNTER — VIRTUAL VISIT (OUTPATIENT)
Dept: PULMONOLOGY | Age: 71
End: 2021-02-02
Payer: MEDICARE

## 2021-02-02 DIAGNOSIS — J84.9 ILD (INTERSTITIAL LUNG DISEASE) (HCC): Primary | ICD-10-CM

## 2021-02-02 PROCEDURE — 99442 PR PHYS/QHP TELEPHONE EVALUATION 11-20 MIN: CPT | Performed by: INTERNAL MEDICINE

## 2021-02-02 RX ORDER — PREDNISONE 1 MG/1
5 TABLET ORAL DAILY
Qty: 30 TABLET | Refills: 0 | Status: SHIPPED | OUTPATIENT
Start: 2021-02-02 | End: 2021-03-04

## 2021-02-02 ASSESSMENT — ENCOUNTER SYMPTOMS
NAUSEA: 0
SHORTNESS OF BREATH: 0
ABDOMINAL PAIN: 0
WHEEZING: 0
EYE REDNESS: 0
VOMITING: 0
COUGH: 0
CHEST TIGHTNESS: 0
BACK PAIN: 0
EYE ITCHING: 0
EYE PAIN: 0

## 2021-02-02 NOTE — PROGRESS NOTES
Pulmonology Telephone Visit    Pursuant to the emergency declaration under the [address] [facility], 1135 waiver authority and the [employer] General Act this Telephone Visit was insisted, with patient's consent, to reduce the patient's risk of exposure to COVID-19 and provide continuity of care for an established patient. The patient was at home, while the provider was at the clinic. Services were provided through a synchronous discussion through a Telephone Call to substitute for in-person clinic visit, and coded as such. Total time spent with patient was 12 minutes. [facility] Pulmonary and Critical Care    Outpatient Note    Subjective:   CHIEF COMPLAINT:   No chief complaint on file. Interval history on 2/2/21  Overall he feels fine. He denies SOB. He gets SOB though walking to the mailbox. He is on prednisone at 10mg. Overall he is slowly getting better. Interval history on 12/8/20  Breathing is better. He is currently on 15 mg daily. It was decreased on 11/19  There is no fever or chills. There is no joints pain or swelling. Interval history on 9/8/20  He is on prednisone, currently on 20mg. He is complaining of puffy face, some leg swelling and feels hungry all the time. Breathing is more or less the same, does not feel better or worse since I saw him last.      Interval history on 6/25/20  No new symptoms. No hemoptysis or worsening SOB after bronchoscopy     HPI:  6/5/20   The patient is 69 y.o. male is here for evaluation of abnormal CT  His main complain is SOB on exertion. This is going on for couple year. Slowly progressive. He was on an inhaler before but it didn't help. Currently he can walk about 2 blocks and can climb two flights of stairs without stopping. Has occasional cough, usually dry. There is no fever or chills. There is no new meds. He has a can over the past two year. There is no pain. He has hx of mild heart attack. Did not have any stent. 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 No exposures to fumes, chemicals or dust  No skin rash. No eye redness or pain. No joints pain, back pain or swellings. Former smoker. Used to smoke 1.5 PPD for 43 years. 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  Hyperlipidemia     Hypertension        Social History:    Social History     Tobacco Use   Smoking Status Former Smoker    Packs/day: 1.50    Years: 43.00    Pack years: 64.50    Types: Cigarettes    Quit date: 2011    Years since quittin.6   Smokeless Tobacco Never Used       Family History:  Family History   Problem Relation Age of Onset    Coronary Art Dis Brother        Current Medications:  Current Outpatient Medications on File Prior to Visit   Medication Sig Dispense Refill    cyanocobalamin 1000 MCG/ML injection Inject 1 mL into the muscle every 30 days 10 mL 0    Multiple Vitamins-Minerals (THERAPEUTIC MULTIVITAMIN-MINERALS) tablet Take 1 tablet by mouth daily      ipratropium-albuterol (DUONEB) 0.5-2.5 (3) MG/3ML SOLN nebulizer solution Inhale 3 mLs into the lungs every 6 hours as needed for Shortness of Breath 120 vial 1    sildenafil (VIAGRA) 100 MG tablet Take 1 tablet by mouth daily as needed for Erectile Dysfunction 20 tablet 3    Syringe/Needle, Disp, (SYRINGE 3CC/25GX1\") 25G X 1\" 3 ML MISC Use to inject B12 IM once a month.  (Patient not taking: Reported on 2021) 4 each 5    cyanocobalamin 1000 MCG tablet Take 1,000 mcg by mouth daily      pantoprazole (PROTONIX) 40 MG tablet Take 40 mg by mouth daily  hydroCHLOROthiazide (MICROZIDE) 12.5 MG capsule Take 1 capsule by mouth every morning (Patient not taking: Reported on 2/2/2021) 30 capsule 5    ibuprofen (ADVIL;MOTRIN) 200 MG tablet Take 200 mg by mouth every 12 hours as needed for Pain 2-3 tablets      atorvastatin (LIPITOR) 10 MG tablet Take 1 tablet by mouth daily (Patient not taking: Reported on 2/2/2021) 90 tablet 2     No current facility-administered medications on file prior to visit. REVIEW OF SYSTEMS:    Review of Systems   Constitutional: Negative for chills, fatigue, fever and unexpected weight change (lost 5 lbs over years). HENT: Negative for congestion and postnasal drip. Eyes: Negative for pain, redness and itching. Respiratory: Negative for cough, chest tightness, shortness of breath and wheezing. Cardiovascular: Negative for chest pain, palpitations and leg swelling. Gastrointestinal: Negative for abdominal pain, nausea and vomiting. Musculoskeletal: Negative for arthralgias, back pain and myalgias. Skin: Negative for rash. Neurological: Negative for weakness. Psychiatric/Behavioral: The patient is not nervous/anxious. All other systems reviewed and are negative. Objective:   PHYSICAL EXAM:        VITALS:  There were no vitals taken for this visit. Physical Exam    DATA:    PFT on 5/18/20  FEV1/FVC is 71 with predicted ratio of 73. Post bronchodilator FEV1/FVC is 77  FEV1 is 1.87L which is 58% predicted - improved to 2. 02L with 8% reversibility  FVC is 2.62 which is 60% predicted   TLC is 3.96 which is 56% predicted   RV is 1.31 which is 49% predicted  DLCO is 11.42 which is 39% predicted     CT chest on 6/1/20  The predominant pattern is irregular reticulation type subpleural fibrosis which is more prominent in the upper lobes but also involves the lower lobes. There is early honeycombing in the right upper lobe.  Primarily due to the distribution, CT findings are not diagnostic for a UIP pattern. Differential considerations include hypersensitivity pneumonitis, sarcoidosis, nonspecific interstitial pneumonitis, and less likely pneumoconiosis but idiopathic pulmonary fibrosis remains a primary consideration    particularly given the rapid progression since the prior study. LDCT on 7/26/19  FINDINGS:       LUNGS AND AIRWAYS: Airways are patent.  No lobar consolidation. Mild reticulation in the subpleural regions most pronounced about the upper lobes may relate to pneumonitis. Pulmonary nodule right apex axial image 37 oval solid measures 6 x 3 mm. Subpleural pulmonary nodules at the apex measuring less than 5 mm axial image 47.       PLEURA: Mild biapical pleural thickening likely relates to fibrosis. No pleural fluid.       HEART AND GREAT VESSELS: Heart size is normal.  Calcification of the thoracic aorta. No aneurysm. Marked coronary artery calcification. No pericardial effusion.       ADENOPATHY/MEDIASTINUM: No adenopathy.       CHEST WALL / LOWER NECK: No significant abnormality. CT chest on 11/9/20  Significant improvement in appearance of the lungs, with resolution of coarse groundglass/nodular peripheral opacities seen on the prior study. Relatively mild nonspecific subpleural fibrosis with early right upper lobe honeycombing is stable. Pulmonary Function Test: 11/13/2020     Indication: Interstitial lung disease     Smoked for 41 years     Test comment:     Spirometry data is acceptable and reproducible.     Maximum effort given during the test.     Pulse ox is 96% on room air     Estimated body mass index is 23.68 kg/m² as calculated from the   following:     Height as of 6/16/20: 5' 10\" (1.778 m).     Weight as of 6/16/20: 165 lb (74.8 kg).      Spirometry data:     FEV1/FVC: 81. Predicted ratio 73     Pre-Bronchodilator FEV1 2.47L, which is 73% predicted     Post-Bronchodilator FEV1 2.52L, which is 75% predicted     There is 2% reversibility   FVC is 3.06L, which is 66% predicted     Lung Volumes:     TLC (by Plethysmography) is 4.89L, which is 67% predicted     RV is 1.76L which is 68% predicted     Diffusion Capacity:     DLCO is 16.73 which is 50% predicted     Impression:     1. There is no obstruction present     2. There is no significant reversibility with bronchodilator         [Increase in FEV1 => 12% of control and => 200 ml]     3. There is no significant hyperinflation / air trapping     4. There is moderate restriction     4. There is moderate reduction in diffusion capacity     Comment:   Moderate restrictive physiology consistent with the above   mentioned diagnosis of ILD     Assessment:      Diagnosis Orders   1. ILD (interstitial lung disease) (HCC)  Carbon Monoxide Diffusing Capacity    Full PFT Study Without Bronchdilator       Plan:   69year old male with ILD. CT scan show upper lobes predominant interstitial changes. There is significant progression between 7/26/19 and 6/1/20. ROS is not suggestive of specific illness. No joints, skin or eye conditions. No new or previous known exposures. 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 He has a cat. He had it for two years. He doesn't have birds and did not live or work in a farm. PFT show severe restrictive disease based on TLC and severe reduction in diffusion capacity. Hypersensitivity panel is negative   ELISHA is negative   RF is n<10  Sed rate is 38  Bronchoscopic transbronchial biopsy in non diagnostic   BAL does not suggest an infectious process. Diatherix is positive for pseudomonas aeruginosa. This is likely colonization rather than true infection given the CT findings. He is on prednisone 15mg daily at this time, decreased from 20 on 11/19. He was started on prednisone 40mg at the end of June 2020. CT scan looks much better. PFT also improved.     Prednisone decreased to 10mg daily on 12/19     Plan Decrease prednisone to 5mg daily on 2/15 and continue for one month then stop. Get new PFT end of March to reassess.

## 2021-06-22 RX ORDER — CYANOCOBALAMIN 1000 UG/ML
1000 INJECTION INTRAMUSCULAR; SUBCUTANEOUS
Qty: 10 ML | Refills: 0 | Status: SHIPPED | OUTPATIENT
Start: 2021-06-22 | End: 2022-04-18

## 2022-07-30 NOTE — TELEPHONE ENCOUNTER
Patient is scheduled 2/2-2021 and will be short a few days of prednisone. He would like to know if you would send him a small amount to [employer] in [place] to get him through until his appt?   Thank you [phone]

## 2025-07-15 NOTE — PROGRESS NOTES
I talked to . Rica Mai after reviewing his CT chest which has shown some improvement in his interstitial lung disease. Clinically he seems to be doing some better in regards to his shortness of breath but still gets winded going up the stairs    I discussed the plan with his primary pulmonologist. Dr. Abel Lane and we will drop his prednisone to 15 mg daily with a planned course of at least 4 weeks.   The patient knows to call our office to schedule a follow-up telephone or virtual visit with Dr. Abel Lane in the next 2 to 4 weeks
4 = No assist / stand by assistance

## (undated) DEVICE — FORCEP BX 1.8 MMX100 CM NDL RADIAL JAW DISP

## (undated) DEVICE — Z DISCONTINUED USE 2749457 TUBING SAMP AD W12.5XH8.4IN D9.1IN NSL ORAL SMRT CAPNOLINE

## (undated) DEVICE — TRAP SPEC COLL 40CC MUCOUS CALIB UNIV CONN FOR OPN SUCT